# Patient Record
Sex: MALE | Race: WHITE | NOT HISPANIC OR LATINO | URBAN - METROPOLITAN AREA
[De-identification: names, ages, dates, MRNs, and addresses within clinical notes are randomized per-mention and may not be internally consistent; named-entity substitution may affect disease eponyms.]

---

## 2018-10-16 ENCOUNTER — INPATIENT (INPATIENT)
Facility: HOSPITAL | Age: 63
LOS: 0 days | Discharge: LEFT BEFORE DISCHARGE | End: 2018-10-17
Attending: INTERNAL MEDICINE | Admitting: INTERNAL MEDICINE

## 2018-10-16 VITALS
RESPIRATION RATE: 20 BRPM | OXYGEN SATURATION: 97 % | TEMPERATURE: 96 F | HEART RATE: 71 BPM | DIASTOLIC BLOOD PRESSURE: 105 MMHG | SYSTOLIC BLOOD PRESSURE: 195 MMHG

## 2018-10-16 DIAGNOSIS — Z98.890 OTHER SPECIFIED POSTPROCEDURAL STATES: Chronic | ICD-10-CM

## 2018-10-16 LAB
ALBUMIN SERPL ELPH-MCNC: 4.5 G/DL — SIGNIFICANT CHANGE UP (ref 3.5–5.2)
ALP SERPL-CCNC: 41 U/L — SIGNIFICANT CHANGE UP (ref 30–115)
ALT FLD-CCNC: 74 U/L — HIGH (ref 0–41)
ANION GAP SERPL CALC-SCNC: 17 MMOL/L — HIGH (ref 7–14)
APPEARANCE UR: CLEAR — SIGNIFICANT CHANGE UP
APTT BLD: 37 SEC — SIGNIFICANT CHANGE UP (ref 27–39.2)
AST SERPL-CCNC: 37 U/L — SIGNIFICANT CHANGE UP (ref 0–41)
BASOPHILS # BLD AUTO: 0.06 K/UL — SIGNIFICANT CHANGE UP (ref 0–0.2)
BASOPHILS NFR BLD AUTO: 0.6 % — SIGNIFICANT CHANGE UP (ref 0–1)
BILIRUB SERPL-MCNC: 1.6 MG/DL — HIGH (ref 0.2–1.2)
BILIRUB UR-MCNC: NEGATIVE — SIGNIFICANT CHANGE UP
BUN SERPL-MCNC: 16 MG/DL — SIGNIFICANT CHANGE UP (ref 10–20)
CALCIUM SERPL-MCNC: 9.1 MG/DL — SIGNIFICANT CHANGE UP (ref 8.5–10.1)
CHLORIDE SERPL-SCNC: 103 MMOL/L — SIGNIFICANT CHANGE UP (ref 98–110)
CK MB CFR SERPL CALC: 6.5 NG/ML — HIGH (ref 0.6–6.3)
CK SERPL-CCNC: 228 U/L — HIGH (ref 0–225)
CO2 SERPL-SCNC: 23 MMOL/L — SIGNIFICANT CHANGE UP (ref 17–32)
COLOR SPEC: YELLOW — SIGNIFICANT CHANGE UP
CREAT SERPL-MCNC: 1 MG/DL — SIGNIFICANT CHANGE UP (ref 0.7–1.5)
DIFF PNL FLD: NEGATIVE — SIGNIFICANT CHANGE UP
EOSINOPHIL # BLD AUTO: 0.22 K/UL — SIGNIFICANT CHANGE UP (ref 0–0.7)
EOSINOPHIL NFR BLD AUTO: 2.1 % — SIGNIFICANT CHANGE UP (ref 0–8)
GLUCOSE SERPL-MCNC: 102 MG/DL — HIGH (ref 70–99)
GLUCOSE UR QL: NEGATIVE MG/DL — SIGNIFICANT CHANGE UP
HCT VFR BLD CALC: 44.6 % — SIGNIFICANT CHANGE UP (ref 42–52)
HGB BLD-MCNC: 15.5 G/DL — SIGNIFICANT CHANGE UP (ref 14–18)
IMM GRANULOCYTES NFR BLD AUTO: 0.5 % — HIGH (ref 0.1–0.3)
INR BLD: 1.12 RATIO — SIGNIFICANT CHANGE UP (ref 0.65–1.3)
KETONES UR-MCNC: NEGATIVE — SIGNIFICANT CHANGE UP
LEUKOCYTE ESTERASE UR-ACNC: ABNORMAL
LYMPHOCYTES # BLD AUTO: 1.68 K/UL — SIGNIFICANT CHANGE UP (ref 1.2–3.4)
LYMPHOCYTES # BLD AUTO: 15.8 % — LOW (ref 20.5–51.1)
MAGNESIUM SERPL-MCNC: 2.1 MG/DL — SIGNIFICANT CHANGE UP (ref 1.8–2.4)
MCHC RBC-ENTMCNC: 31.6 PG — HIGH (ref 27–31)
MCHC RBC-ENTMCNC: 34.8 G/DL — SIGNIFICANT CHANGE UP (ref 32–37)
MCV RBC AUTO: 91 FL — SIGNIFICANT CHANGE UP (ref 80–94)
MONOCYTES # BLD AUTO: 0.97 K/UL — HIGH (ref 0.1–0.6)
MONOCYTES NFR BLD AUTO: 9.1 % — SIGNIFICANT CHANGE UP (ref 1.7–9.3)
NEUTROPHILS # BLD AUTO: 7.68 K/UL — HIGH (ref 1.4–6.5)
NEUTROPHILS NFR BLD AUTO: 71.9 % — SIGNIFICANT CHANGE UP (ref 42.2–75.2)
NITRITE UR-MCNC: NEGATIVE — SIGNIFICANT CHANGE UP
NT-PROBNP SERPL-SCNC: 1242 PG/ML — HIGH (ref 0–300)
PH UR: 6.5 — SIGNIFICANT CHANGE UP (ref 5–8)
PLATELET # BLD AUTO: 148 K/UL — SIGNIFICANT CHANGE UP (ref 130–400)
POTASSIUM SERPL-MCNC: 4.5 MMOL/L — SIGNIFICANT CHANGE UP (ref 3.5–5)
POTASSIUM SERPL-SCNC: 4.5 MMOL/L — SIGNIFICANT CHANGE UP (ref 3.5–5)
PROT SERPL-MCNC: 6.8 G/DL — SIGNIFICANT CHANGE UP (ref 6–8)
PROT UR-MCNC: NEGATIVE MG/DL — SIGNIFICANT CHANGE UP
PROTHROM AB SERPL-ACNC: 12.9 SEC — HIGH (ref 9.95–12.87)
RBC # BLD: 4.9 M/UL — SIGNIFICANT CHANGE UP (ref 4.7–6.1)
RBC # FLD: 12.9 % — SIGNIFICANT CHANGE UP (ref 11.5–14.5)
SODIUM SERPL-SCNC: 143 MMOL/L — SIGNIFICANT CHANGE UP (ref 135–146)
SP GR SPEC: 1.01 — SIGNIFICANT CHANGE UP (ref 1.01–1.03)
TROPONIN T SERPL-MCNC: <0.01 NG/ML — SIGNIFICANT CHANGE UP
TYPE + AB SCN PNL BLD: SIGNIFICANT CHANGE UP
UROBILINOGEN FLD QL: 0.2 MG/DL — SIGNIFICANT CHANGE UP (ref 0.2–0.2)
WBC # BLD: 10.66 K/UL — SIGNIFICANT CHANGE UP (ref 4.8–10.8)
WBC # FLD AUTO: 10.66 K/UL — SIGNIFICANT CHANGE UP (ref 4.8–10.8)

## 2018-10-16 RX ORDER — CLOPIDOGREL BISULFATE 75 MG/1
75 TABLET, FILM COATED ORAL DAILY
Qty: 0 | Refills: 0 | Status: DISCONTINUED | OUTPATIENT
Start: 2018-10-16 | End: 2018-10-17

## 2018-10-16 RX ORDER — METOPROLOL TARTRATE 50 MG
5 TABLET ORAL ONCE
Qty: 0 | Refills: 0 | Status: COMPLETED | OUTPATIENT
Start: 2018-10-16 | End: 2018-10-16

## 2018-10-16 RX ORDER — ASPIRIN/CALCIUM CARB/MAGNESIUM 324 MG
81 TABLET ORAL DAILY
Qty: 0 | Refills: 0 | Status: DISCONTINUED | OUTPATIENT
Start: 2018-10-16 | End: 2018-10-17

## 2018-10-16 RX ORDER — CLOPIDOGREL BISULFATE 75 MG/1
300 TABLET, FILM COATED ORAL ONCE
Qty: 0 | Refills: 0 | Status: COMPLETED | OUTPATIENT
Start: 2018-10-16 | End: 2018-10-16

## 2018-10-16 RX ORDER — ASPIRIN/CALCIUM CARB/MAGNESIUM 324 MG
325 TABLET ORAL ONCE
Qty: 0 | Refills: 0 | Status: COMPLETED | OUTPATIENT
Start: 2018-10-16 | End: 2018-10-16

## 2018-10-16 RX ORDER — SODIUM CHLORIDE 9 MG/ML
1000 INJECTION INTRAMUSCULAR; INTRAVENOUS; SUBCUTANEOUS ONCE
Qty: 0 | Refills: 0 | Status: COMPLETED | OUTPATIENT
Start: 2018-10-16 | End: 2018-10-16

## 2018-10-16 RX ADMIN — Medication 5 MILLIGRAM(S): at 16:12

## 2018-10-16 RX ADMIN — Medication 325 MILLIGRAM(S): at 22:01

## 2018-10-16 RX ADMIN — SODIUM CHLORIDE 1000 MILLILITER(S): 9 INJECTION INTRAMUSCULAR; INTRAVENOUS; SUBCUTANEOUS at 14:29

## 2018-10-16 RX ADMIN — CLOPIDOGREL BISULFATE 300 MILLIGRAM(S): 75 TABLET, FILM COATED ORAL at 22:02

## 2018-10-16 RX ADMIN — SODIUM CHLORIDE 1000 MILLILITER(S): 9 INJECTION INTRAMUSCULAR; INTRAVENOUS; SUBCUTANEOUS at 15:45

## 2018-10-16 NOTE — H&P ADULT - NSHPPHYSICALEXAM_GEN_ALL_CORE
PHYSICAL EXAM:  GENERAL: NAD, speaks in full sentences, no signs of respiratory distress  HEAD:  Atraumatic, Normocephalic  EYES: conjunctiva and sclera clear  NECK: Supple, No JVD  CHEST/LUNG: Clear to auscultation bilaterally; No wheeze; No crackles; No accessory muscles used  HEART: irregular; No murmurs;   ABDOMEN: Soft, Nontender, Nondistended; Bowel sounds present; No guarding  EXTREMITIES:  2+ Peripheral Pulses, No cyanosis or edema  PSYCH: AAOx3  NEUROLOGY: non-focal  SKIN: No rashes or lesions

## 2018-10-16 NOTE — H&P ADULT - HISTORY OF PRESENT ILLNESS
62yo Male with PMH below presented to the hospital for SOB with minimal exertion and chest tightness x 2-3 weeks. During episodes he has a chest pulling sensation in middle of chest that is worse with deep breathing. No back pain, no abdominal pain.  Patient had an echo outpt last week and was found to have atrial fibrillation (new) (stress echo was canceled - which was routine evaluation per pt), a valve abnorm, and thoracic aortic aneurysm of 5.5 cm (per pt); he is planned to have cardiac cath with Dr. Ryder this thursday for further evaluation. He spoke to his cards this AM - and advised to go to ED for evaluation concerned for aneurysm.    	Pt denies chest pain now, leg swelling, n/v, diaphoresis, or orthopnea. He is not sob now, but re-ocurrs with minimal exertion. Has been using his spiriva pump more this week with no resolution, has not used albuterol.    	Reports increased urination, not on diuretics, no dysuria. Deneis fever, cough, sick contacts. No recent travel. 62 yo Male with PMH below presented to the hospital for SOB with minimal exertion and chest tightness x 2-3 weeks. During episodes he has a chest pulling sensation in middle of chest that is worse with deep breathing. No back pain, no abdominal pain.  Patient had an echo outpt last week and was found to have atrial fibrillation (new) (stress echo was canceled - which was routine evaluation per pt), a valve abnorm, and thoracic aortic aneurysm of 5.5 cm (per pt); he is planned to have cardiac cath with Dr. Ryder this wednesday for further evaluation, and see EP Dr Townsend Thursday. He states that he was advised to go to ED prior to his cardiac cath.    	Pt denies chest pain now, leg swelling, n/v, diaphoresis, or orthopnea. He is not sob now, but re-ocurrs with minimal exertion. Has been using his spiriva pump more this week with no resolution, has not used albuterol.    	Reports increased urination, not on diuretics, no dysuria. Deneis fever, cough, sick contacts. No recent travel. 62 yo Male with PMH below presented to the hospital for SOB with minimal exertion and chest tightness x 2-3 weeks. During episodes he has a chest pulling sensation in middle of chest that is worse with deep breathing. No back pain, no abdominal pain. Patient states he saw Dr. Ryder and was scheduled to have an ECHO. Patient had an echo done and was found to have atrial fibrillation (new) (stress echo was canceled - which was routine evaluation per pt), a valve abnormal, and thoracic aortic aneurysm of 5.5 cm (per pt); he is planned to have cardiac cath with Dr. Ryder this wednesday for further evaluation, and see EP Dr Townsend Thursday. He states that he was advised to go to ED prior to his cardiac cath.    	Pt denies chest pain now, leg swelling, n/v, diaphoresis, or orthopnea. He is not sob now, but re-ocurrs with minimal exertion. Has been using his spiriva pump more this week with no resolution, has not used albuterol.    	Reports increased urination, not on diuretics, no dysuria. Deneis fever, cough, sick contacts. No recent travel. 64 yo Male with PMH as below presented to the hospital for SOB with minimal exertion and substernal chest tightness x 2-3 weeks. During episodes he has a chest pulling sensation in middle of chest that is worse with deep breathing. No back pain, no abdominal pain. Patient states he saw Dr. Ryder and was scheduled to have an ECHO. Patient had an echo done and was found to have atrial fibrillation (new) (stress echo was canceled - which was routine evaluation per pt), a valve abnormal, and thoracic aortic aneurysm of 5.5 cm (per pt). Patient states that he was told to stop taking aspirin and was put on xarelto and metoprolol. States that he was told that is he still continues to have symptoms to call cardiology. States that he called his cardiologist and was told he is planned to have cardiac cath with Dr. Ryder this wednesday/tomorrow for further evaluation, and see EP Dr Townsend Thursday. He states that he was advised to go to ED prior to his cardiac cath. Pt denies chest pain now, leg swelling, n/v, diaphoresis, or orthopnea. He is not sob now, but re-ocurrs with minimal exertion (ex when bending down).  Deneis fever, chills, nausea, vomitting cough, sick contacts, recent travel, diarrhea, dysuria. States he has chronic constipation.

## 2018-10-16 NOTE — ED ADULT NURSE REASSESSMENT NOTE - NS ED NURSE REASSESS COMMENT FT1
Pt assessed A&OX3, RN watching cardiac monitor noticed pt had run of v.tach , MD immediately notified , pt given 5 mg lopressor IV As per MD orders. As per MD to continue monitoring pt. pt's wife at bedside. will continue to monitor.

## 2018-10-16 NOTE — H&P ADULT - NSHPLABSRESULTS_GEN_ALL_CORE
Allergies    No Known Allergies    Intolerances        T(F): 98.7 (10-16-18 @ 17:00), Max: 98.7 (10-16-18 @ 17:00)  HR: 100 (10-16-18 @ 17:00) (71 - 113)  BP: 133/91 (10-16-18 @ 17:00) (133/91 - 195/105)  BP(mean): --  RR: 18 (10-16-18 @ 17:00) (18 - 20)  SpO2: 96% (10-16-18 @ 17:00) (96% - 97%)    10-16    143  |  103  |  16  ----------------------------<  102<H>  4.5   |  23  |  1.0    Ca    9.1      16 Oct 2018 13:49  Mg     2.1     10-16    TPro  6.8  /  Alb  4.5  /  TBili  1.6<H>  /  DBili  x   /  AST  37  /  ALT  74<H>  /  AlkPhos  41  10-16                            15.5   10.66 )-----------( 148      ( 16 Oct 2018 13:49 )             44.6                   Urinalysis Basic - ( 16 Oct 2018 13:41 )    Color: Yellow / Appearance: Clear / S.015 / pH: x  Gluc: x / Ketone: Negative  / Bili: Negative / Urobili: 0.2 mg/dL   Blood: x / Protein: Negative mg/dL / Nitrite: Negative   Leuk Esterase: Trace / RBC: 1-2 /HPF / WBC 1-2 /HPF   Sq Epi: x / Non Sq Epi: Occasional /HPF / Bacteria: x                  Urinalysis Basic - ( 16 Oct 2018 13:41 )    Color: Yellow / Appearance: Clear / S.015 / pH: x  Gluc: x / Ketone: Negative  / Bili: Negative / Urobili: 0.2 mg/dL   Blood: x / Protein: Negative mg/dL / Nitrite: Negative   Leuk Esterase: Trace / RBC: 1-2 /HPF / WBC 1-2 /HPF   Sq Epi: x / Non Sq Epi: Occasional /HPF / Bacteria: x        PT/INR - ( 16 Oct 2018 13:49 )   PT: 12.90 sec;   INR: 1.12 ratio         PTT - ( 16 Oct 2018 13:49 )  PTT:37.0 sec    Lactate Trend      CARDIAC MARKERS ( 16 Oct 2018 13:49 )  x     / <0.01 ng/mL / 228 U/L / x     / 6.5 ng/mL        < from: CT Chest No Cont (10.16.18 @ 16:57) >    1.  Ascending thoracic aorta measures 4.5 cm.  2.  Main pulmonary artery measures 4.6 cm, most consistent with pulmonary   arterial hypertension.    < end of copied text >

## 2018-10-16 NOTE — H&P ADULT - PSYCHIATRIC
Meets SIRS criteria with tachycardia, fevers. WBC Persistently elevated, unclear etiology. Knee and peritoneal fluid obtained and do not appear infected. Received vanc zosyn yesterday.   - Continue empiric antibiotics  - f/u knee imaging  - F/U cx  - f/u am CBC negative Meets SIRS criteria with tachycardia, fevers. WBC Persistently elevated, unclear etiology. Knee and peritoneal fluid obtained and do not appear infected. CXR with new infiltrate. Received vanc zosyn yesterday.   - Continue empiric antibiotics  - f/u knee imaging  - F/U cx  - f/u am CBC Diarrhea noted on follow up exam this AM; c.diff positive  - Vanco 125 PO q6

## 2018-10-16 NOTE — H&P ADULT - NSHPSOCIALHISTORY_GEN_ALL_CORE
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

## 2018-10-16 NOTE — ED PROVIDER NOTE - MEDICAL DECISION MAKING DETAILS
labs, ct reviewed. pt seen by cardiology. Pt voiced understanding and agrees with plan. Admitted to telemetry.

## 2018-10-16 NOTE — ED PROVIDER NOTE - PHYSICAL EXAMINATION
PHYSICAL EXAM:  GENERAL: anxious speaks in full sentences, no signs of respiratory distress, speaks in full sentences, wife at bedside  HEAD:  Atraumatic, Normocephalic  EYES: EOMI, conjunctiva and sclera clear  NECK: Supple, No JVD  CHEST/LUNG: Clear to auscultation bilaterally; No wheeze; No crackles; No accessory muscles used  HEART: irregulary irregular, pulses palpable symmetric and equal b/l radial  ABDOMEN: Soft, Nontender, Nondistended; Bowel sounds present; No guarding  EXTREMITIES:  no LE edema or tenderness, moves all ext  PSYCH: AAOx3  NEUROLOGY: non-focal  SKIN: No rashes or lesions

## 2018-10-16 NOTE — ED PROVIDER NOTE - CARE PLAN
Principal Discharge DX:	Shortness of breath  Secondary Diagnosis:	Afib  Secondary Diagnosis:	Thoracic aortic aneurysm without rupture

## 2018-10-16 NOTE — ED PROVIDER NOTE - PMH
Afib    CAD (coronary artery disease)  pci 2016  COPD (chronic obstructive pulmonary disease)    Depression    HTN (hypertension)    ETIENNE on CPAP    Thoracic aortic aneurysm without rupture

## 2018-10-16 NOTE — ED PROVIDER NOTE - ATTENDING CONTRIBUTION TO CARE
New afib last week. concern for expanding thoracic aneurysm with valve abnormality. Planned for cath tomorrow. presenting with sob at rest. On Xarelto. found to be in afib avr to 120. hd stable New afib last week. concern for expanding thoracic aneurysm with valve abnormality. Planned for cath tomorrow. presenting with sob at rest. On Xarelto. found to be in afib avr to 120. hd stable. Concern for symptomatic SVT vs APE vs ACS. no STEMI. Discussed with cardiology. Recommends non contrast ct to eval for TAA and admission for urgent catheterization. - labs including ce, ekg, trp, cxr, ct chest, rate control. admit.

## 2018-10-16 NOTE — ED PROVIDER NOTE - PROGRESS NOTE DETAILS
Check: CE, CMP, Mg, BNP. Place on tele monitor. CTA dissection study. CXR. Check: CE, CMP, Mg, BNP. Place on tele monitor. CTA dissection study. CXR. UA. Check: CE, CMP, Mg, BNP. Place on tele monitor. CTA dissection study. CXR. UA. Telemonitoring. I personally evaluated the patient. I reviewed the Resident's note (as assigned above), and agree with the findings and plan except as documented in my note.   64 y/o M PMHx of CAD s/p stent, recently diagnosed A-fib, on anticoagulation which was stopped yesterday;  presenting with SOB at rest, found to be in A-fib RVR to 120, HD stable. Pt was seen by cardiologist at bedside, pt had outpatient imaging demonstrating a TAA of approximately 5.5 on ultrasound. Physical Exam: AAOX3, NAD, NCAT, EOMI, PERRL, MMM, Neck Supple, Irregular tachycardic heartbeat, CTABL, ABD S/NT/ND +BS, No CVAT, EXT warm, well perfused, No Edema, Distal Pulses Intact, No Rash,  MAEx4, Sensation to soft touch grossly intact, normal strength/tone  Plan: Cardiology request non-contrast CT on eval, pt will receive cath tomorrow for eval of angina and will have aortal DERREK performed at that time, low suspicion for dissection,  will obtain labs, EKG, CT, rate control, and admit. 62 y/o M PMHx of CAD s/p stent, recently diagnosed A-fib, on anticoagulation which was stopped yesterday;  presenting with SOB at rest, found to be in A-fib RVR to 120, HD stable. Pt was seen by cardiologist at bedside, pt had outpatient imaging demonstrating a TAA of approximately 5.5 on ultrasound. Physical Exam: AAOX3, NAD, NCAT, EOMI, PERRL, MMM, Neck Supple, Irregular tachycardic heartbeat, CTABL, ABD S/NT/ND +BS, No CVAT, EXT warm, well perfused, No Edema, Distal Pulses Intact, No Rash,  MAEx4, Sensation to soft touch grossly intact, normal strength/tone  Plan: Cardiology request non-contrast CT on eval, pt will receive cath tomorrow for eval of angina and will have aortal DERREK performed at that time, low suspicion for dissection,  will obtain labs, EKG, CT, rate control, and admit. Pt rate controlled after lopressor 5 ml of IV, remain HD stable, CT demonstrates 4.5 TAA, admitted to Medicine for cath tomorrow.

## 2018-10-16 NOTE — CONSULT NOTE ADULT - ASSESSMENT
Thoracic aortic aneurysm  - 5.5 cm on outpatient echo  - non contrast chest CT for evaluation    Typical angina  - NPO for cath tomorrow  - c/w DAPT  - hold xarelto for cath     Paroxysmal Atrial fibrillation  - would rate control with cardizem gtt (BP can tolerate)  - on xarelto at home, on hold for cath Thoracic aortic aneurysm  - 5.5 cm on outpatient echo  - non contrast chest CT for evaluation    Typical angina  - NPO for cath tomorrow  - c/w DAPT  - hold xarelto for cath     Paroxysmal Atrial fibrillation  - would rate control with cardizem gtt (BP can tolerate)  - on xarelto at home, on hold for cath  - supposed to see dr black.

## 2018-10-16 NOTE — ED PROVIDER NOTE - NS ED ROS FT
REVIEW OF SYSTEMS:    CONSTITUTIONAL: No weakness or fevers  EYES/ENT: No visual changes  NECK: No pain or stiffness  RESPIRATORY: see hpi  CARDIOVASCULAR: see hpi  GASTROINTESTINAL: No abdominal pain. No nausea or vomiting; No diarrhea or constipation. No bloody or black colored stool  GENITOURINARY: increased urination  NEUROLOGICAL: No numbness or weakness  SKIN: No itching, rashes

## 2018-10-16 NOTE — H&P ADULT - ASSESSMENT
# angina  - admit to tele  - cardiac cath tomorrow  - npo after midnight  - c/w metoprolol 50mg q24h  - serial cardiac enzymes  - c/w DAPT      # BPH  -tamsulosin    #paroxysmal AFIB  p/w RVR  -metoprolol 25mg q24h # angina  - admit to tele  - cardiac cath tomorrow  - npo after midnight  - c/w metoprolol 50mg q24h  - serial cardiac enzymes  - cardiology on board- DAPT (patient laoded with plavix +aspirin)    # BPH  -tamsulosin    #paroxysmal AFIB    -patient was in RVR on admission, resolved now  -metoprolol 25mg q24h  -hold xarelto for cardiac cath (last dose taken 10/15)    #COPD- stable  -patient on spirva only at home    #anxiety  venlefaxine

## 2018-10-16 NOTE — H&P ADULT - FAMILY HISTORY
Father  Still living? Unknown  Family history of acute myocardial infarction, Age at diagnosis: Age Unknown     Mother  Still living? Unknown  Family history of acute myocardial infarction, Age at diagnosis: Age Unknown

## 2018-10-16 NOTE — ED ADULT NURSE NOTE - NSIMPLEMENTINTERV_GEN_ALL_ED
Implemented All Universal Safety Interventions:  [place] to call system. Call bell, personal items and telephone within reach. Instruct patient to call for assistance. Room bathroom lighting operational. Non-slip footwear when patient is off stretcher. Physically safe environment: no spills, clutter or unnecessary equipment. Stretcher in lowest position, wheels locked, appropriate side rails in place.

## 2018-10-16 NOTE — CONSULT NOTE ADULT - SUBJECTIVE AND OBJECTIVE BOX
Date of Admission:    CHIEF COMPLAINT:    HISTORY OF PRESENT ILLNESS: 63yMale with PMH below presented to the hospital for  SOB with minimal exertion and chest tightness. During episodes he has a chest pulling sensation in middle of chest that is worse with deep breathing. No back pain, no abdominal pain.    	Had echo outpt this week - found to have new AF (stress echo was canceled - which was routine evaluation per pt), a valve abnorm, and thoracic aortic aneurysm of 5.5 cm (per pt); he is planned to have cardiac cath with Dr. Ryder this thursday for further evaluation. He spoke to his cards this AM - and advised to go to ED for evaluation concerned for aneurysm.    	Pt denies chest pain now, leg swelling, n/v, diaphoresis, or orthopnea. He is not sob now, but re-ocurrs with minimal exertion. Has been using his spiriva pump more this week with no resolution, has not used albuterol.    	Reports increased urination, not on diuretics, no dysuria. Deneis fever, cough, sick contacts. No recent travel.  	      PAST MEDICAL & SURGICAL HISTORY:  Afib  Depression  Thoracic aortic aneurysm without rupture  COPD (chronic obstructive pulmonary disease)  ETIENNE on CPAP  HTN (hypertension)  CAD (coronary artery disease): pci 2016  H/O hernia repair  History of percutaneous coronary intervention: 2016    HEALTH ISSUES - PROBLEM Dx:        FAMILY HISTORY:    None [x ]  Mother:   Father:   Siblings:     SOCIAL HISTORY:    [ ] Non-smoker  [x ] Smoker: former... quit 2 years ago  [ ] Alcohol    Allergies    No Known Allergies    Intolerances    	    REVIEW OF SYSTEMS:  CONSTITUTIONAL: No fever, weight loss, or fatigue  CARDIOLOGY: see HPI  RESPIRATORY: See HPI  NEUROLOGICAL: NO weakness, no focal deficits to report.  ENDOCRINOLOGICAL: no recent change in diabetic medications.   GI: no BRBPR, no N,V,diarrhea.    PSYCHIATRY: normal mood and affect  HEENT: no nasal discharge, no ecchymosis  SKIN: no ecchymosis, no breakdown  MUSCULOSKELETAL: Full range of motion x4.      PHYSICAL EXAM:  T(C): 35.7 (10-16-18 @ 12:46), Max: 35.7 (10-16-18 @ 12:46)  HR: 71 (10-16-18 @ 12:46) (71 - 71)  BP: 195/105 (10-16-18 @ 12:46) (195/105 - 195/105)  RR: 20 (10-16-18 @ 12:46) (20 - 20)  SpO2: 97% (10-16-18 @ 12:46) (97% - 97%)  Wt(kg): --  I&O's Summary    Daily     Daily     General Appearance: Normal	  Cardiovascular: rapid and irregular S1 S2, No JVD, No murmurs, No edema  Respiratory: Lungs clear to auscultation	  Psychiatry: A & O x 3, Mood & affect appropriate  Gastrointestinal:  Soft, Non-tender  Skin: No rashes, No ecchymoses, No cyanosis	  Neurologic: Non-focal  Musculoskeletal/ extremities: Normal range of motion, No clubbing, cyanosis or edema  Vascular: Peripheral pulses palpable 2+ bilaterally    LABS:	 	                          15.5   10.66 )-----------( 148      ( 16 Oct 2018 13:49 )             44.6     10-16    143  |  103  |  16  ----------------------------<  102<H>  4.5   |  23  |  1.0    Ca    9.1      16 Oct 2018 13:49  Mg     2.1     10-16    TPro  6.8  /  Alb  4.5  /  TBili  1.6<H>  /  DBili  x   /  AST  37  /  ALT  74<H>  /  AlkPhos  41  10-16    CARDIAC MARKERS ( 16 Oct 2018 13:49 )  x     / <0.01 ng/mL / 228 U/L / x     / 6.5 ng/mL      PT/INR - ( 16 Oct 2018 13:49 )   PT: 12.90 sec;   INR: 1.12 ratio         PTT - ( 16 Oct 2018 13:49 )  PTT:37.0 sec    CARDIAC MARKERS:            TELEMETRY EVENTS: 	    ECG:  < from: 12 Lead ECG (10.16.18 @ 12:54) >  Diagnosis Line Atrial fibrillation with rapid ventricular response  Left axis deviation  Left anterior fascicular block  Cannot rule out Anterior infarct , age undetermined  Abnormal ECG    < end of copied text >  	  RADIOLOGY:  OTHER: 	    PREVIOUS DIAGNOSTIC TESTING:    [x ] Echocardiogram:  [ ]  Catheterization: PCI 2016  [ ] Stress Test:  	  	    Home Medications:    MEDICATIONS  (STANDING):    MEDICATIONS  (PRN): Date of Admission:    CHIEF COMPLAINT:    HISTORY OF PRESENT ILLNESS: 63yMale with PMH below presented to the hospital for  SOB with minimal exertion and chest tightness. During episodes he has a chest pulling sensation in middle of chest that is worse with deep breathing. No back pain, no abdominal pain.    	Had echo outpt this week - found to have new AF (stress echo was canceled - which was routine evaluation per pt), a valve abnorm, and thoracic aortic aneurysm of 5.5 cm (per pt); he is planned to have cardiac cath with Dr. Ryder this thursday for further evaluation. He spoke to his cards this AM - and advised to go to ED for evaluation concerned for aneurysm.    	Pt denies chest pain now, leg swelling, n/v, diaphoresis, or orthopnea. He is not sob now, but re-ocurrs with minimal exertion. Has been using his spiriva pump more this week with no resolution, has not used albuterol.    	Reports increased urination, not on diuretics, no dysuria. Deneis fever, cough, sick contacts. No recent travel.  	      PAST MEDICAL & SURGICAL HISTORY:  Afib  Depression  Thoracic aortic aneurysm without rupture  COPD (chronic obstructive pulmonary disease)  ETIENNE on CPAP  HTN (hypertension)  CAD (coronary artery disease): pci 2016  H/O hernia repair  History of percutaneous coronary intervention: 2016    HEALTH ISSUES - PROBLEM Dx:        FAMILY HISTORY:    None [x ]  Mother:   Father:   Siblings:     SOCIAL HISTORY:    [ ] Non-smoker  [x ] Smoker: former... quit 2 years ago  [ ] Alcohol    Allergies    No Known Allergies    Intolerances    	    REVIEW OF SYSTEMS:  CONSTITUTIONAL: No fever, weight loss, or fatigue  CARDIOLOGY: see HPI  RESPIRATORY: See HPI  NEUROLOGICAL: NO weakness, no focal deficits to report.  ENDOCRINOLOGICAL: no recent change in diabetic medications.   GI: no BRBPR, no N,V,diarrhea.    PSYCHIATRY: normal mood and affect  HEENT: no nasal discharge, no ecchymosis  SKIN: no ecchymosis, no breakdown  MUSCULOSKELETAL: Full range of motion x4.      PHYSICAL EXAM:  T(C): 35.7 (10-16-18 @ 12:46), Max: 35.7 (10-16-18 @ 12:46)  HR: 71 (10-16-18 @ 12:46) (71 - 71)  BP: 195/105 (10-16-18 @ 12:46) (195/105 - 195/105)  RR: 20 (10-16-18 @ 12:46) (20 - 20)  SpO2: 97% (10-16-18 @ 12:46) (97% - 97%)  Wt(kg): --  I&O's Summary    Daily     Daily     General Appearance: Normal	  Cardiovascular: rapid and irregular S1 S2, No JVD, No murmurs, No edema  Respiratory: Lungs clear to auscultation	  Psychiatry: A & O x 3, Mood & affect appropriate  Gastrointestinal:  Soft, Non-tender  Skin: No rashes, No ecchymoses, No cyanosis	  Neurologic: Non-focal  Musculoskeletal/ extremities: Normal range of motion, No clubbing, cyanosis or edema  Vascular: Peripheral pulses palpable 2+ bilaterally    LABS:	 	                          15.5   10.66 )-----------( 148      ( 16 Oct 2018 13:49 )             44.6     10-16    143  |  103  |  16  ----------------------------<  102<H>  4.5   |  23  |  1.0    Ca    9.1      16 Oct 2018 13:49  Mg     2.1     10-16    TPro  6.8  /  Alb  4.5  /  TBili  1.6<H>  /  DBili  x   /  AST  37  /  ALT  74<H>  /  AlkPhos  41  10-16    CARDIAC MARKERS ( 16 Oct 2018 13:49 )  x     / <0.01 ng/mL / 228 U/L / x     / 6.5 ng/mL      PT/INR - ( 16 Oct 2018 13:49 )   PT: 12.90 sec;   INR: 1.12 ratio         PTT - ( 16 Oct 2018 13:49 )  PTT:37.0 sec    CARDIAC MARKERS:    CT chest ascending aorta 4.5 cm.          TELEMETRY EVENTS: 	    ECG:  < from: 12 Lead ECG (10.16.18 @ 12:54) >  Diagnosis Line Atrial fibrillation with rapid ventricular response  Left axis deviation  Left anterior fascicular block  Cannot rule out Anterior infarct , age undetermined  Abnormal ECG    < end of copied text >  	  RADIOLOGY:  OTHER: 	    PREVIOUS DIAGNOSTIC TESTING:    [x ] Echocardiogram:  [ ]  Catheterization: PCI 2016  [ ] Stress Test:  	  	    Home Medications:    MEDICATIONS  (STANDING):    MEDICATIONS  (PRN):

## 2018-10-16 NOTE — ED PROVIDER NOTE - OBJECTIVE STATEMENT
63/M hx of CAD s/p PCI (2016, cards Dr. Orteag), New AFib (on xarelto - on hold last few days), thoracic aortic aneurysm (5.5 cm - pt states), valve abnorm, HTN. COPD, ETIENNE (uses cpap at night), presents to ED with SOB on minimal exertion and chest tightness x 2-3 days.    Had echo outpt this week - found to have new AF, valve abnorm, and thoracic aortic aneurysm of 5.5 cm (per pt); he is planned to have cardiac cath with Dr. Ortega this thrusday. He spoke to his cards this AM - and advised to go to ED, he as concerned about the aneurysm.    Pt deneis chest pain now, leg swelling, n/v, diaphoresis, or orthopnea. He is not sob now, but recurrs with minimal exertion.  Reports increased urination, not on diuretics, no dysuria.  Deneis fever, cough, sick contacts. 63/M hx of CAD s/p PCI (2016, cards Dr. Ortega), New AFib (on xarelto - on hold last few days), thoracic aortic aneurysm (5.5 cm - pt states), x-smoker (15 PY, quit 2 yrs ago), valve abnorm, HTN. COPD, ETIENNE (uses cpap at night), presents to ED with SOB on minimal exertion and chest tightness x 2-3 days.    Had echo outpt this week - found to have new AF, valve abnorm, and thoracic aortic aneurysm of 5.5 cm (per pt); he is planned to have cardiac cath with Dr. Ortega this thrusday. He spoke to his cards this AM - and advised to go to ED, he as concerned about the aneurysm.    Pt deneis chest pain now, leg swelling, n/v, diaphoresis, or orthopnea. He is not sob now, but recurrs with minimal exertion. Has been using his spiriva pump more this week with no resolution.    Reports increased urination, not on diuretics, no dysuria.  Deneis fever, cough, sick contacts.    No prior EKG or echo. Hx from pt and wife. 63/M hx of CAD s/p PCI (2016, cards Dr. Ortega), New AFib (on xarelto - on hold last few days), thoracic aortic aneurysm (5.5 cm - pt states), x-smoker (15 PY, quit 2 yrs ago), valve abnorm, HTN. COPD, ETIENNE (uses cpap at night), presents to ED with SOB on minimal exertion and chest tightness x 2-3 days. During episodes he has a chest pulling sensation in middle of chest that is worse with deep breathing. No back pain, no abdominal pain.    Had echo outpt this week - found to have new AF (stress echo was canceled - which was routine evaluation per pt), a valve abnorm, and thoracic aortic aneurysm of 5.5 cm (per pt); he is planned to have cardiac cath with Dr. Ortega this thrusday for further evaluation. He spoke to his cards this AM - and advised to go to ED for evaluation.    Pt deneis chest pain now, leg swelling, n/v, diaphoresis, or orthopnea. He is not sob now, but reocurrs with minimal exertion. Has been using his spiriva pump more this week with no resolution, has not used albuterol.    Reports increased urination, not on diuretics, no dysuria. Deneis fever, cough, sick contacts. No recent travel.   No prior EKG or echo. Hx from pt and wife. 63/M hx of CAD s/p PCI (2016, cards Dr. Ortega), New AFib (on xarelto - on hold last few days), thoracic aortic aneurysm (5.5 cm - pt states), x-smoker (15 PY, quit 2 yrs ago), valve abnorm, HTN. COPD, ETIENNE (uses cpap at night), presents to ED with SOB on minimal exertion and chest tightness x 2-3 days. During episodes he has a chest pulling sensation in middle of chest that is worse with deep breathing. No back pain, no abdominal pain.    Had echo outpt this week - found to have new AF (stress echo was canceled - which was routine evaluation per pt), a valve abnorm, and thoracic aortic aneurysm of 5.5 cm (per pt); he is planned to have cardiac cath with Dr. Ortega this thrusday for further evaluation. He spoke to his cards this AM - and advised to go to ED for evaluation concerned for aneurysm.    Pt deneis chest pain now, leg swelling, n/v, diaphoresis, or orthopnea. He is not sob now, but reocurrs with minimal exertion. Has been using his spiriva pump more this week with no resolution, has not used albuterol.    Reports increased urination, not on diuretics, no dysuria. Deneis fever, cough, sick contacts. No recent travel.   No prior EKG or echo. Hx from pt and wife. 63/M hx of CAD s/p PCI (2016, cards Dr. Ortega), New AFib (on xarelto - last took was 10/15), thoracic aortic aneurysm (5.5 cm - pt states), x-smoker (15 PY, quit 2 yrs ago), valve abnorm, HTN. COPD, ETIENNE (uses cpap at night), presents to ED with SOB on minimal exertion and chest tightness x 2-3 days. During episodes he has a chest pulling sensation in middle of chest that is worse with deep breathing. No back pain, no abdominal pain.    Had echo outpt this week - found to have new AF (stress echo was canceled - which was routine evaluation per pt), a valve abnorm, and thoracic aortic aneurysm of 5.5 cm (per pt); he is planned to have cardiac cath with Dr. Ortega this thrusday for further evaluation. He spoke to his cards this AM - and advised to go to ED for evaluation concerned for aneurysm.    Pt deneis chest pain now, leg swelling, n/v, diaphoresis, or orthopnea. He is not sob now, but reocurrs with minimal exertion. Has been using his spiriva pump more this week with no resolution, has not used albuterol.    Reports increased urination, not on diuretics, no dysuria. Deneis fever, cough, sick contacts. No recent travel.   No prior EKG or echo. Hx from pt and wife. 63/M hx of CAD s/p PCI (2016, cards Dr. Ortega), New AFib (on xarelto - last took was 10/15), thoracic aortic aneurysm (5.5 cm - pt states), x-smoker (15 PY, quit 2 yrs ago), valve abnormality, HTN. COPD, ETIENNE (uses cpap at night), presents to ED with SOB on minimal exertion and chest tightness x 2-3 days. During episodes he has a chest pulling sensation in middle of chest that is worse with deep breathing. No back pain, no abdominal pain.    Had echo outpt this week - found to have new AF (stress echo was canceled - which was routine evaluation per pt), a valve abnorm, and thoracic aortic aneurysm of 5.5 cm (per pt); he is planned to have cardiac cath with Dr. Ortega this thrusday for further evaluation. He spoke to his cards this AM - and advised to go to ED for evaluation concerned for aneurysm.    Pt deneis chest pain now, leg swelling, n/v, diaphoresis, or orthopnea. He is not sob now, but reocurrs with minimal exertion. Has been using his spiriva pump more this week with no resolution, has not used albuterol.    Reports increased urination, not on diuretics, no dysuria. Deneis fever, cough, sick contacts. No recent travel.   No prior EKG or echo. Hx from pt and wife.

## 2018-10-16 NOTE — ED ADULT NURSE REASSESSMENT NOTE - NS ED NURSE REASSESS COMMENT FT1
pt assessed A&Ox3, pt on cardiac monitor pt bp 150/123, MD made aware, Md to order medication for treatment. Safety and comfort measures maintained. Pt wife at bedside.  Will continue to monitor.

## 2018-10-16 NOTE — ED ADULT TRIAGE NOTE - CHIEF COMPLAINT QUOTE
SOB and tightness of chest for a couple of days now, pt states he was suppose to have a cath tomorrow

## 2018-10-17 VITALS
TEMPERATURE: 96 F | RESPIRATION RATE: 18 BRPM | DIASTOLIC BLOOD PRESSURE: 60 MMHG | HEART RATE: 69 BPM | SYSTOLIC BLOOD PRESSURE: 114 MMHG

## 2018-10-17 LAB
ANION GAP SERPL CALC-SCNC: 14 MMOL/L — SIGNIFICANT CHANGE UP (ref 7–14)
APTT BLD: 32.5 SEC — SIGNIFICANT CHANGE UP (ref 27–39.2)
BASOPHILS # BLD AUTO: 0.05 K/UL — SIGNIFICANT CHANGE UP (ref 0–0.2)
BASOPHILS NFR BLD AUTO: 0.8 % — SIGNIFICANT CHANGE UP (ref 0–1)
BUN SERPL-MCNC: 13 MG/DL — SIGNIFICANT CHANGE UP (ref 10–20)
CALCIUM SERPL-MCNC: 8.6 MG/DL — SIGNIFICANT CHANGE UP (ref 8.5–10.1)
CHLORIDE SERPL-SCNC: 103 MMOL/L — SIGNIFICANT CHANGE UP (ref 98–110)
CHOLEST SERPL-MCNC: 101 MG/DL — SIGNIFICANT CHANGE UP (ref 100–200)
CK MB CFR SERPL CALC: 6.1 NG/ML — SIGNIFICANT CHANGE UP (ref 0.6–6.3)
CK SERPL-CCNC: 184 U/L — SIGNIFICANT CHANGE UP (ref 0–225)
CO2 SERPL-SCNC: 24 MMOL/L — SIGNIFICANT CHANGE UP (ref 17–32)
CREAT SERPL-MCNC: 0.9 MG/DL — SIGNIFICANT CHANGE UP (ref 0.7–1.5)
EOSINOPHIL # BLD AUTO: 0.27 K/UL — SIGNIFICANT CHANGE UP (ref 0–0.7)
EOSINOPHIL NFR BLD AUTO: 4.4 % — SIGNIFICANT CHANGE UP (ref 0–8)
GLUCOSE SERPL-MCNC: 97 MG/DL — SIGNIFICANT CHANGE UP (ref 70–99)
HCT VFR BLD CALC: 43.7 % — SIGNIFICANT CHANGE UP (ref 42–52)
HDLC SERPL-MCNC: 47 MG/DL — SIGNIFICANT CHANGE UP
HGB BLD-MCNC: 15.2 G/DL — SIGNIFICANT CHANGE UP (ref 14–18)
IMM GRANULOCYTES NFR BLD AUTO: 0.2 % — SIGNIFICANT CHANGE UP (ref 0.1–0.3)
INR BLD: 1.15 RATIO — SIGNIFICANT CHANGE UP (ref 0.65–1.3)
LIPID PNL WITH DIRECT LDL SERPL: 51 MG/DL — SIGNIFICANT CHANGE UP (ref 4–129)
LYMPHOCYTES # BLD AUTO: 0.96 K/UL — LOW (ref 1.2–3.4)
LYMPHOCYTES # BLD AUTO: 15.7 % — LOW (ref 20.5–51.1)
MCHC RBC-ENTMCNC: 31.3 PG — HIGH (ref 27–31)
MCHC RBC-ENTMCNC: 34.8 G/DL — SIGNIFICANT CHANGE UP (ref 32–37)
MCV RBC AUTO: 90.1 FL — SIGNIFICANT CHANGE UP (ref 80–94)
MONOCYTES # BLD AUTO: 0.8 K/UL — HIGH (ref 0.1–0.6)
MONOCYTES NFR BLD AUTO: 13.1 % — HIGH (ref 1.7–9.3)
NEUTROPHILS # BLD AUTO: 4.01 K/UL — SIGNIFICANT CHANGE UP (ref 1.4–6.5)
NEUTROPHILS NFR BLD AUTO: 65.8 % — SIGNIFICANT CHANGE UP (ref 42.2–75.2)
PLATELET # BLD AUTO: 151 K/UL — SIGNIFICANT CHANGE UP (ref 130–400)
POTASSIUM SERPL-MCNC: 4 MMOL/L — SIGNIFICANT CHANGE UP (ref 3.5–5)
POTASSIUM SERPL-SCNC: 4 MMOL/L — SIGNIFICANT CHANGE UP (ref 3.5–5)
PROTHROM AB SERPL-ACNC: 13.2 SEC — HIGH (ref 9.95–12.87)
RBC # BLD: 4.85 M/UL — SIGNIFICANT CHANGE UP (ref 4.7–6.1)
RBC # FLD: 12.6 % — SIGNIFICANT CHANGE UP (ref 11.5–14.5)
SODIUM SERPL-SCNC: 141 MMOL/L — SIGNIFICANT CHANGE UP (ref 135–146)
TOTAL CHOLESTEROL/HDL RATIO MEASUREMENT: 2.1 RATIO — LOW (ref 4–5.5)
TRIGL SERPL-MCNC: 73 MG/DL — SIGNIFICANT CHANGE UP (ref 10–149)
TROPONIN T SERPL-MCNC: <0.01 NG/ML — SIGNIFICANT CHANGE UP
TSH SERPL-MCNC: 1.99 UIU/ML — SIGNIFICANT CHANGE UP (ref 0.27–4.2)
WBC # BLD: 6.1 K/UL — SIGNIFICANT CHANGE UP (ref 4.8–10.8)
WBC # FLD AUTO: 6.1 K/UL — SIGNIFICANT CHANGE UP (ref 4.8–10.8)

## 2018-10-17 RX ORDER — TAMSULOSIN HYDROCHLORIDE 0.4 MG/1
0.4 CAPSULE ORAL AT BEDTIME
Qty: 0 | Refills: 0 | Status: DISCONTINUED | OUTPATIENT
Start: 2018-10-17 | End: 2018-10-17

## 2018-10-17 RX ORDER — ATORVASTATIN CALCIUM 80 MG/1
80 TABLET, FILM COATED ORAL AT BEDTIME
Qty: 0 | Refills: 0 | Status: DISCONTINUED | OUTPATIENT
Start: 2018-10-17 | End: 2018-10-17

## 2018-10-17 RX ORDER — CHLORHEXIDINE GLUCONATE 213 G/1000ML
1 SOLUTION TOPICAL
Qty: 0 | Refills: 0 | Status: DISCONTINUED | OUTPATIENT
Start: 2018-10-17 | End: 2018-10-17

## 2018-10-17 RX ORDER — RIVAROXABAN 15 MG-20MG
20 KIT ORAL EVERY 24 HOURS
Qty: 0 | Refills: 0 | Status: DISCONTINUED | OUTPATIENT
Start: 2018-10-18 | End: 2018-10-17

## 2018-10-17 RX ORDER — SODIUM CHLORIDE 9 MG/ML
450 INJECTION INTRAMUSCULAR; INTRAVENOUS; SUBCUTANEOUS
Qty: 0 | Refills: 0 | Status: DISCONTINUED | OUTPATIENT
Start: 2018-10-17 | End: 2018-10-17

## 2018-10-17 RX ORDER — METOPROLOL TARTRATE 50 MG
50 TABLET ORAL THREE TIMES A DAY
Qty: 0 | Refills: 0 | Status: DISCONTINUED | OUTPATIENT
Start: 2018-10-17 | End: 2018-10-17

## 2018-10-17 RX ORDER — SODIUM CHLORIDE 9 MG/ML
1000 INJECTION INTRAMUSCULAR; INTRAVENOUS; SUBCUTANEOUS
Qty: 0 | Refills: 0 | Status: DISCONTINUED | OUTPATIENT
Start: 2018-10-17 | End: 2018-10-17

## 2018-10-17 RX ORDER — INFLUENZA VIRUS VACCINE 15; 15; 15; 15 UG/.5ML; UG/.5ML; UG/.5ML; UG/.5ML
0.5 SUSPENSION INTRAMUSCULAR ONCE
Qty: 0 | Refills: 0 | Status: DISCONTINUED | OUTPATIENT
Start: 2018-10-17 | End: 2018-10-17

## 2018-10-17 RX ORDER — LOSARTAN POTASSIUM 100 MG/1
50 TABLET, FILM COATED ORAL DAILY
Qty: 0 | Refills: 0 | Status: DISCONTINUED | OUTPATIENT
Start: 2018-10-17 | End: 2018-10-17

## 2018-10-17 RX ORDER — TIOTROPIUM BROMIDE 18 UG/1
1 CAPSULE ORAL; RESPIRATORY (INHALATION) DAILY
Qty: 0 | Refills: 0 | Status: DISCONTINUED | OUTPATIENT
Start: 2018-10-17 | End: 2018-10-17

## 2018-10-17 RX ORDER — VENLAFAXINE HCL 75 MG
37.5 CAPSULE, EXT RELEASE 24 HR ORAL DAILY
Qty: 0 | Refills: 0 | Status: DISCONTINUED | OUTPATIENT
Start: 2018-10-17 | End: 2018-10-17

## 2018-10-17 RX ORDER — METOPROLOL TARTRATE 50 MG
25 TABLET ORAL DAILY
Qty: 0 | Refills: 0 | Status: DISCONTINUED | OUTPATIENT
Start: 2018-10-17 | End: 2018-10-17

## 2018-10-17 RX ORDER — NITROGLYCERIN 6.5 MG
0.4 CAPSULE, EXTENDED RELEASE ORAL
Qty: 0 | Refills: 0 | Status: DISCONTINUED | OUTPATIENT
Start: 2018-10-17 | End: 2018-10-17

## 2018-10-17 RX ADMIN — Medication 37.5 MILLIGRAM(S): at 11:13

## 2018-10-17 RX ADMIN — CHLORHEXIDINE GLUCONATE 1 APPLICATION(S): 213 SOLUTION TOPICAL at 06:16

## 2018-10-17 RX ADMIN — Medication 25 MILLIGRAM(S): at 06:14

## 2018-10-17 RX ADMIN — Medication 81 MILLIGRAM(S): at 11:13

## 2018-10-17 RX ADMIN — LOSARTAN POTASSIUM 50 MILLIGRAM(S): 100 TABLET, FILM COATED ORAL at 06:14

## 2018-10-17 RX ADMIN — TIOTROPIUM BROMIDE 1 CAPSULE(S): 18 CAPSULE ORAL; RESPIRATORY (INHALATION) at 08:28

## 2018-10-17 RX ADMIN — CLOPIDOGREL BISULFATE 75 MILLIGRAM(S): 75 TABLET, FILM COATED ORAL at 11:13

## 2018-10-17 NOTE — PROGRESS NOTE ADULT - ASSESSMENT
# angina  - cardiac cath today - can resume diet after returning from cath lab  - c/w metoprolol 50mg q24h  -     # BPH  -tamsulosin    #paroxysmal AFIB    -patient was in RVR on admission, resolved now, HR has been WNL's on floor  -metoprolol 25mg q24h  -can resume xarelto tonight    #COPD- stable  -patient on spirva only at home    #anxiety  venlefaxine # angina  - trops negative  - cardiac cath today - can resume diet after returning from cath lab  - c/w metoprolol, statin, aspirin     # BPH  -tamsulosin    #paroxysmal AFIB    -patient was in RVR on admission, resolved now, HR has been WNL's on floor  -metoprolol 25mg q24h  -can resume xarelto tonight    #COPD- stable  -patient on spirva only at home    #anxiety  venlefaxine

## 2018-10-17 NOTE — PROGRESS NOTE ADULT - SUBJECTIVE AND OBJECTIVE BOX
PREOPERATIVE DAY OF PROCEDURE EVALUATION:  I have personally seen and examined the patient.  I agree with the history and physical which I have reviewed and noted any changes below.  (Signed electronically by __________)  10-17-18 @ 14:30

## 2018-10-17 NOTE — CHART NOTE - NSCHARTNOTEFT_GEN_A_CORE
Patient wanted to go home after cardiac cath was done. Spoke to Cardiac fellow about the discharge.  wanted to monitor the patient overnight for atrial fibrillation. Patient was not willing to stay overnight. Explained the risks of leaving the hospital without heart rate control. PAtient is aware of the risks. Explained about the risks to the wife as well. Patient decided to leave against medical advice.

## 2018-10-17 NOTE — PROGRESS NOTE ADULT - SUBJECTIVE AND OBJECTIVE BOX
POST OPERATIVE PROCEDURAL DOCUMENTATION  PRE-OP DIAGNOSIS: CAD    POST-OP DIAGNOSIS: non-obstructive CAD    PROCEDURE:   LEFT HEART CATHERIZATION    Physician: Britni STERLING  Assistant: Richie STERLING    ANESTHESIA TYPE:  [ x] Sedation  [x ] Local/Regional  [  ]General Anesthesia    ESTIMATED BLOOD LOSS:     <10 ml     CONDITION  [x ] Good  [   ] Fair  [   ] Serious  [   ] Critical      SPECIMENS REMOVED (IF APPLICABLE):  none      IMPLANTS (IF APPLICABLE): none      FINDINGS:    LEFT HEART CATHERIZATION                                    LVEF%: 55%  LVEDP: WNL    Left main: WNL    LAD: patent prior proximal stent                           Left Circumflex:  mild disease      Right Cornary Artery: mild disease      PERCUTANEOUS CORONARY INTERVENTIONS: none      COMPLICATIONS: none      POST-OP DIAGNOSIS    [ ] Normal Coronary Arteries  [ ] Luminal Irregularities  [x ] Non-obstructive CAD        PLAN OF CARE      [ ] D/C Home today   [ ]  D/C in AM  [x ] Return to In-patient bed  [ ] Admit for observation  [ ] Return for staged procedure:  [ ] CT Surgery consult called  [ ]  Continue DAPT, B-blocker & Statin therapy  [x ]  Medical Therapy  [x ] Aggressive risk factor modification. The patient should follow a low fat and low calorie diet.    Optimization of rate control and resume xarelto. POST OPERATIVE PROCEDURAL DOCUMENTATION  PRE-OP DIAGNOSIS: CAD    POST-OP DIAGNOSIS: non-obstructive CAD    PROCEDURE:   LEFT HEART CATHERIZATION    Physician: Britni STERLING  Assistant: Richie STERLING    ANESTHESIA TYPE:  [ x] Sedation  [x ] Local/Regional  [  ]General Anesthesia    ESTIMATED BLOOD LOSS:     <10 ml     CONDITION  [x ] Good  [   ] Fair  [   ] Serious  [   ] Critical      SPECIMENS REMOVED (IF APPLICABLE):  none      IMPLANTS (IF APPLICABLE): none      FINDINGS:    LEFT HEART CATHERIZATION                                    LVEF%: 55%  LVEDP: WNL    Left main: WNL    LAD: patent prior proximal stent                           Left Circumflex:  mild disease      Right Cornary Artery: mild disease      PERCUTANEOUS CORONARY INTERVENTIONS: none      COMPLICATIONS: none      POST-OP DIAGNOSIS    [ ] Normal Coronary Arteries  [ ] Luminal Irregularities  [x ] Non-obstructive CAD        PLAN OF CARE      [ ] D/C Home today   [ ]  D/C in AM  [x ] Return to In-patient bed  [ ] Admit for observation  [ ] Return for staged procedure:  [ ] CT Surgery consult called  [ ]  Continue DAPT, B-blocker & Statin therapy  [x ]  Medical Therapy  [x ] Aggressive risk factor modification. The patient should follow a low fat and low calorie diet.    Optimization of rate control and resume xarelto.      agree

## 2018-10-17 NOTE — PROGRESS NOTE ADULT - SUBJECTIVE AND OBJECTIVE BOX
SUBJECTIVE:    Patient is a 63y old Male who presents with a chief complaint of Post Cath (17 Oct 2018 16:08)    Stable, no acute events overnight. This morning he does not have any complaints this morning.     PAST MEDICAL & SURGICAL HISTORY  BPH (benign prostatic hyperplasia)  Afib  Depression  Thoracic aortic aneurysm without rupture  COPD (chronic obstructive pulmonary disease)  ETIENNE on CPAP  HTN (hypertension)  CAD (coronary artery disease): pci   H/O hernia repair  History of percutaneous coronary intervention: 2016    SOCIAL HISTORY:  Negative for smoking/alcohol/drug use.     ALLERGIES:  No Known Allergies    MEDICATIONS:  STANDING MEDICATIONS  aspirin  chewable 81 milliGRAM(s) Oral daily  atorvastatin 80 milliGRAM(s) Oral at bedtime  chlorhexidine 4% Liquid 1 Application(s) Topical <User Schedule>  influenza   Vaccine 0.5 milliLiter(s) IntraMuscular once  losartan 50 milliGRAM(s) Oral daily  metoprolol tartrate 50 milliGRAM(s) Oral three times a day  sodium chloride 0.9%. 1000 milliLiter(s) IV Continuous <Continuous>  sodium chloride 0.9%. 450 milliLiter(s) IV Continuous <Continuous>  tamsulosin 0.4 milliGRAM(s) Oral at bedtime  tiotropium 18 MICROgram(s) Capsule 1 Capsule(s) Inhalation daily  venlafaxine XR. 37.5 milliGRAM(s) Oral daily    PRN MEDICATIONS  nitroglycerin     SubLingual 0.4 milliGRAM(s) SubLingual every 5 minutes PRN    VITALS:   T(F): 96.2  HR: 69  BP: 144/85  RR: 18  SpO2: --    LABS:                        15.2   6.10  )-----------( 151      ( 17 Oct 2018 08:24 )             43.7     10-17    141  |  103  |  13  ----------------------------<  97  4.0   |  24  |  0.9    Ca    8.6      17 Oct 2018 08:24  Mg     2.1     10-16    TPro  6.8  /  Alb  4.5  /  TBili  1.6<H>  /  DBili  x   /  AST  37  /  ALT  74<H>  /  AlkPhos  41  10-16    PT/INR - ( 17 Oct 2018 08:24 )   PT: 13.20 sec;   INR: 1.15 ratio         PTT - ( 17 Oct 2018 08:24 )  PTT:32.5 sec  Urinalysis Basic - ( 16 Oct 2018 13:41 )    Color: Yellow / Appearance: Clear / S.015 / pH: x  Gluc: x / Ketone: Negative  / Bili: Negative / Urobili: 0.2 mg/dL   Blood: x / Protein: Negative mg/dL / Nitrite: Negative   Leuk Esterase: Trace / RBC: 1-2 /HPF / WBC 1-2 /HPF   Sq Epi: x / Non Sq Epi: Occasional /HPF / Bacteria: x        Creatine Kinase, Serum: 184 U/L (10-17-18 @ 08:24)  Troponin T, Serum: <0.01 ng/mL (10-17-18 @ 08:24)      CARDIAC MARKERS ( 17 Oct 2018 08:24 )  x     / <0.01 ng/mL / 184 U/L / x     / 6.1 ng/mL  CARDIAC MARKERS ( 16 Oct 2018 13:49 )  x     / <0.01 ng/mL / 228 U/L / x     / 6.5 ng/mL          10-17-18 @ 07:01  -  10-17-18 @ 18:15  --------------------------------------------------------  IN: 525 mL / OUT: 200 mL / NET: 325 mL          PHYSICAL EXAM:  GEN: NAD  LUNGS: CTAB  HEART: RRR  ABD: soft, +BS  EXT: no edema  NEURO: AAOx3

## 2018-10-24 DIAGNOSIS — F41.9 ANXIETY DISORDER, UNSPECIFIED: ICD-10-CM

## 2018-10-24 DIAGNOSIS — I10 ESSENTIAL (PRIMARY) HYPERTENSION: ICD-10-CM

## 2018-10-24 DIAGNOSIS — I71.2 THORACIC AORTIC ANEURYSM, WITHOUT RUPTURE: ICD-10-CM

## 2018-10-24 DIAGNOSIS — R00.0 TACHYCARDIA, UNSPECIFIED: ICD-10-CM

## 2018-10-24 DIAGNOSIS — R06.02 SHORTNESS OF BREATH: ICD-10-CM

## 2018-10-24 DIAGNOSIS — Z87.891 PERSONAL HISTORY OF NICOTINE DEPENDENCE: ICD-10-CM

## 2018-10-24 DIAGNOSIS — J44.9 CHRONIC OBSTRUCTIVE PULMONARY DISEASE, UNSPECIFIED: ICD-10-CM

## 2018-10-24 DIAGNOSIS — K59.09 OTHER CONSTIPATION: ICD-10-CM

## 2018-10-24 DIAGNOSIS — G47.33 OBSTRUCTIVE SLEEP APNEA (ADULT) (PEDIATRIC): ICD-10-CM

## 2018-10-24 DIAGNOSIS — I25.119 ATHEROSCLEROTIC HEART DISEASE OF NATIVE CORONARY ARTERY WITH UNSPECIFIED ANGINA PECTORIS: ICD-10-CM

## 2018-10-24 DIAGNOSIS — I48.0 PAROXYSMAL ATRIAL FIBRILLATION: ICD-10-CM

## 2018-10-24 DIAGNOSIS — N40.0 BENIGN PROSTATIC HYPERPLASIA WITHOUT LOWER URINARY TRACT SYMPTOMS: ICD-10-CM

## 2018-10-24 DIAGNOSIS — Z98.61 CORONARY ANGIOPLASTY STATUS: ICD-10-CM

## 2018-10-24 DIAGNOSIS — Z99.89 DEPENDENCE ON OTHER ENABLING MACHINES AND DEVICES: ICD-10-CM

## 2018-10-24 DIAGNOSIS — Z79.01 LONG TERM (CURRENT) USE OF ANTICOAGULANTS: ICD-10-CM

## 2018-10-24 PROBLEM — I25.10 ATHEROSCLEROTIC HEART DISEASE OF NATIVE CORONARY ARTERY WITHOUT ANGINA PECTORIS: Chronic | Status: ACTIVE | Noted: 2018-10-16

## 2018-10-24 PROBLEM — I48.91 UNSPECIFIED ATRIAL FIBRILLATION: Chronic | Status: ACTIVE | Noted: 2018-10-16

## 2018-10-30 ENCOUNTER — OUTPATIENT (OUTPATIENT)
Dept: OUTPATIENT SERVICES | Facility: HOSPITAL | Age: 63
LOS: 1 days | Discharge: HOME | End: 2018-10-30

## 2018-10-30 VITALS
OXYGEN SATURATION: 98 % | WEIGHT: 235.89 LBS | TEMPERATURE: 98 F | DIASTOLIC BLOOD PRESSURE: 90 MMHG | HEIGHT: 68 IN | RESPIRATION RATE: 18 BRPM | HEART RATE: 76 BPM | SYSTOLIC BLOOD PRESSURE: 136 MMHG

## 2018-10-30 DIAGNOSIS — I48.0 PAROXYSMAL ATRIAL FIBRILLATION: ICD-10-CM

## 2018-10-30 DIAGNOSIS — Z01.818 ENCOUNTER FOR OTHER PREPROCEDURAL EXAMINATION: ICD-10-CM

## 2018-10-30 DIAGNOSIS — Z98.890 OTHER SPECIFIED POSTPROCEDURAL STATES: Chronic | ICD-10-CM

## 2018-10-30 RX ORDER — TIOTROPIUM BROMIDE 18 UG/1
1 CAPSULE ORAL; RESPIRATORY (INHALATION)
Qty: 0 | Refills: 0 | COMMUNITY

## 2018-10-30 NOTE — H&P PST ADULT - PMH
Afib    BPH (benign prostatic hyperplasia)    CAD (coronary artery disease)  pci 2016  COPD (chronic obstructive pulmonary disease)    Depression    HTN (hypertension)    ETIENNE on CPAP    Thoracic aortic aneurysm without rupture Afib    BPH (benign prostatic hyperplasia)    CAD (coronary artery disease)  pci 2016  COPD (chronic obstructive pulmonary disease)    Depression    GERD without esophagitis    HTN (hypertension)    Myocardial infarction involving other coronary artery    ETIENNE on CPAP    ETIENNE on CPAP    SOB (shortness of breath) on exertion    Thoracic aortic aneurysm without rupture Afib    BPH (benign prostatic hyperplasia)    CAD (coronary artery disease)  pci 2016  COPD (chronic obstructive pulmonary disease)    Depression    GERD without esophagitis    HTN (hypertension)    Myocardial infarction involving other coronary artery  Proximal LAD 90%  ETIENNE on CPAP    ETIENNE on CPAP    SOB (shortness of breath) on exertion    Thoracic aortic aneurysm without rupture Afib    BPH (benign prostatic hyperplasia)    CAD (coronary artery disease)  pci 2016  COPD (chronic obstructive pulmonary disease)    Depression    GERD without esophagitis    HTN (hypertension)    Myocardial infarction involving other coronary artery  Proximal LAD 90%  ETIENNE on CPAP    SOB (shortness of breath) on exertion    Thoracic aortic aneurysm without rupture

## 2018-10-30 NOTE — H&P PST ADULT - REASON FOR ADMISSION
62 Yo male presents to [facility] for EP study, Afib mapping, Afib ablation, MIRIAM under sedation at EPS lab by Dr. Townsend.

## 2018-10-30 NOTE — H&P PST ADULT - HISTORY OF PRESENT ILLNESS
Three weeks ago patient went for a stress echo due to c/o sob. On the monitor patient found to have an afib and scheduled for cardiac cath and it was negative for CAD. Patient denies any c/o cp, palpitations fever, cough or dysuria. Patient c/o sob and fatigue. Ex tolerance of 1/2 fos walk with slight sob. Positive for ETIENNE. Patient is on CPAP at night. Instruction given to bring cpap machine on the DOS. Three weeks ago patient went for a stress echo due to c/o sob. On the cardiac monitor patient found to have an afib and scheduled for cardiac cath on 10/17/2018 and it was negative for CAD. Patient was started on xarelto. At presents patient denies any c/o cp, palpitations fever, cough or dysuria. Patient c/o sob and fatigue. Ex tolerance of 1/2 fos walk with slight sob. Positive for ETIENNE. Patient uses CPAP at night. Instruction given to bring cpap machine on the DOS. Three weeks ago patient went for a stress echo due to c/o sob. On the cardiac monitor patient found to have an afib and scheduled for cardiac cath on 10/17/2018 and it was negative for CAD. Patient was  on xarelto. At presents patient denies any c/o cp, palpitations fever, cough or dysuria. Patient c/o sob and fatigue. Ex tolerance of 1/2 fos walk with slight sob. Positive for ETIENNE. Patient uses CPAP at night. Instruction given to bring cpap machine on the DOS. Three weeks ago patient went for a stress echo due to c/o sob. On the cardiac monitor patient found to have an afib and scheduled for cardiac cath on 10/17/2018 and it was negative for CAD. Patient is on xarelto. At presents patient denies any c/o cp, palpitations fever, cough or dysuria. Patient c/o sob and fatigue. Ex tolerance of 1/2 fos walk with slight sob. Positive for ETIENNE. Patient uses CPAP at night. Instruction given to bring cpap machine on the DOS.

## 2018-10-30 NOTE — H&P PST ADULT - PSH
H/O hernia repair    History of percutaneous coronary intervention  2016 H/O hernia repair    History of percutaneous coronary intervention  2016 with 2 stents H/O hernia repair    History of percutaneous coronary intervention  2016 with 1 stent on Proximal LAD

## 2018-10-31 PROBLEM — I21.29 ST ELEVATION (STEMI) MYOCARDIAL INFARCTION INVOLVING OTHER SITES: Chronic | Status: ACTIVE | Noted: 2018-10-30

## 2018-10-31 PROBLEM — G47.33 OBSTRUCTIVE SLEEP APNEA (ADULT) (PEDIATRIC): Chronic | Status: INACTIVE | Noted: 2018-10-16 | Resolved: 2018-10-30

## 2018-11-07 ENCOUNTER — INPATIENT (INPATIENT)
Facility: HOSPITAL | Age: 63
LOS: 0 days | Discharge: HOME | End: 2018-11-08
Attending: INTERNAL MEDICINE | Admitting: INTERNAL MEDICINE

## 2018-11-07 VITALS — HEIGHT: 69 IN | WEIGHT: 229.94 LBS

## 2018-11-07 DIAGNOSIS — Z98.890 OTHER SPECIFIED POSTPROCEDURAL STATES: Chronic | ICD-10-CM

## 2018-11-07 DIAGNOSIS — I48.0 PAROXYSMAL ATRIAL FIBRILLATION: ICD-10-CM

## 2018-11-07 RX ORDER — GLYCOPYRROLATE AND FORMOTEROL FUMARATE 9; 4.8 UG/1; UG/1
2 AEROSOL, METERED RESPIRATORY (INHALATION)
Qty: 0 | Refills: 0 | Status: DISCONTINUED | OUTPATIENT
Start: 2018-11-07 | End: 2018-11-08

## 2018-11-07 RX ORDER — RIVAROXABAN 15 MG-20MG
20 KIT ORAL EVERY 24 HOURS
Qty: 0 | Refills: 0 | Status: DISCONTINUED | OUTPATIENT
Start: 2018-11-07 | End: 2018-11-08

## 2018-11-07 RX ORDER — METOPROLOL TARTRATE 50 MG
25 TABLET ORAL DAILY
Qty: 0 | Refills: 0 | Status: DISCONTINUED | OUTPATIENT
Start: 2018-11-07 | End: 2018-11-08

## 2018-11-07 RX ORDER — PANTOPRAZOLE SODIUM 20 MG/1
40 TABLET, DELAYED RELEASE ORAL
Qty: 0 | Refills: 0 | Status: DISCONTINUED | OUTPATIENT
Start: 2018-11-07 | End: 2018-11-08

## 2018-11-07 RX ORDER — TAMSULOSIN HYDROCHLORIDE 0.4 MG/1
0.4 CAPSULE ORAL AT BEDTIME
Qty: 0 | Refills: 0 | Status: DISCONTINUED | OUTPATIENT
Start: 2018-11-07 | End: 2018-11-08

## 2018-11-07 RX ORDER — ATORVASTATIN CALCIUM 80 MG/1
80 TABLET, FILM COATED ORAL AT BEDTIME
Qty: 0 | Refills: 0 | Status: DISCONTINUED | OUTPATIENT
Start: 2018-11-07 | End: 2018-11-08

## 2018-11-07 RX ORDER — LOSARTAN POTASSIUM 100 MG/1
50 TABLET, FILM COATED ORAL DAILY
Qty: 0 | Refills: 0 | Status: DISCONTINUED | OUTPATIENT
Start: 2018-11-07 | End: 2018-11-08

## 2018-11-07 RX ORDER — VENLAFAXINE HCL 75 MG
37.5 CAPSULE, EXT RELEASE 24 HR ORAL DAILY
Qty: 0 | Refills: 0 | Status: DISCONTINUED | OUTPATIENT
Start: 2018-11-07 | End: 2018-11-08

## 2018-11-07 RX ADMIN — RIVAROXABAN 20 MILLIGRAM(S): KIT at 19:39

## 2018-11-07 RX ADMIN — TAMSULOSIN HYDROCHLORIDE 0.4 MILLIGRAM(S): 0.4 CAPSULE ORAL at 22:22

## 2018-11-07 RX ADMIN — ATORVASTATIN CALCIUM 80 MILLIGRAM(S): 80 TABLET, FILM COATED ORAL at 22:22

## 2018-11-07 NOTE — CHART NOTE - NSCHARTNOTEFT_GEN_A_CORE
Cardiac Electrophysiology Procedure Note      	  PROCEDURE:  Electrophysiological Study  Administration of Medicines  Intracardiac Ultrasound  Right and left atrial catheterization with dual transeptal approach  CryoAblation of pulmonary veins to treat Atrial Fibrillation  CS Cannulation  Computerized 3D mapping  Fluoroscopy      INDICATION: 62 yo with symptomatic PAF      OPERATORS:    Attending	 Vipul Townsend MD    MATERIALS    Sheath	Insertion Site	Catheter	Location  12 F	RFV	Flex Cath	LA and RA  7 Fr	RFV	Daig Decapolar CS	Coronary Sinus  11 Fr	LFV	ICE	RA      ABLATION CATHETERS    Diameter	Size	Type	System  12 F	28 mm	Arctic Front Cryoballoon	Cryo      BASELINE FINDINGS    Rhythm	CL / Rate	TX	QRS	QT	AH	HV  SR	900	160	89	450	120	45      DESCRIPTION OF PROCEDURE    The patient was brought to the Procedure Room in a nonsedated and fasting state. Informed, written consent was obtained prior to the procedure. Anesthesia maintain comfort and analgesia throughout the procedure. Blood pressure, oxygenation and level of comfort were stable throughout. The right and left groin and right neck regions were cleaned and prepped with the applications of Chloraprep. Patient was then covered from head to toe with sterile drapes in the usual manner.     The left right inguinal areas and arterial pulse were defined; 30 cc of lidocaine solution were infiltrated into the skin overlying the area.     Next, using needle and guidewire, the right femoral vein was accessed once using modified Seldinger technique. All access was obtain using ultrasound guidance. The guidewires were followed up the IVC, right of the spine, to confirm venous access. One introducer sheath was placed into the femoral vein. The dilators and guidewires were removed. Then, using needle and guidewire, the left femoral vein was accessed Twice using modified Seldinger technique. The guidewires were followed up the IVC, right of the spine, to confirm venous access. Two introducer sheaths were placed into the femoral vein. The dilators and guidewires were removed. Esophageal temperature was monitored using esoahpgageal temperature probe. If Temperature decreased below 25C ablation was terminated.     Catheters were placed in the coronary sinus, at the high right atrial position, over the septal tricuspid valve area to obtain and confirm a proximal His signal, and at the RV apex. Diastolic thresholds were found to be adequate.   	  Baseline parameters were obtained and intervals were measured at these sites. Pacing at the HRA was performed to evaluate, AVN function and possible arrhythmia induction. Pacing performed at the RV apex to evaluate retrograde AVN function and evaluate for possible accessory pathways by evaluating retrograde atrial activation pattern and VH to A relationship.    A 10F AcuNav ICE ultrasound catheter was placed through the 11F sheath and positioned into the right atrium to allow visualization of the intra-atrial septum. One SL-1 sheaths were exchanged for the three 8F introducer sheath previously inserted in the right femoral vein. Single transeptal puncture was performed with BRK1 needle and one [facility] 8 F degree SL-1 sheaths via the right femoral vein under direct visualization with intracardiac echocardiography and  fluoroscopy guidance. In both instances, a J wire was followed to the left subclavian vein and the sheath and dilator combo inserted to that level. The wire was exchanged for the BRK1 needle and pulled back under fluoroscopic visualization until the interatrial septum was reached. On all occasions, the sheath needle combo was placed over the septum and into the left atrium with needle advancement. Left atrial location was confirmed for each transeptal puncture with pressure monitoring, micro-bubble confirmation on ICE, after withdrawal of bright red blood from the catheter and with advancement of a guide wire into the left pulmonary vein. Left atrial pressure was measured at 12 mmHg. The SL-1 sheaths were exchanged over a J wire located in the left pulmonary vein for the Medtronic FlexCath adjustable catheter. Intravenous heparin was given prior to performing transeptal puncture and ACTs followed during the rest of the procedure to ensure an ACT greater than 300 secs.     Pulmonary veins diameter was assessed using a combination of ICE. A Lasso catheter was placed in the left atria and sequential mapping and ablation performed in the pulmonary veins using ICE and fluoroscopy guidance. JUAN 3D mapping was utilized to assess the position of the lasso catheter and visualized the CT scan of the LA to assess PV branches. Esophageal temperature was measure using a esophageal temperature probe and if temperature reached below 25 degrees, freezing was immediately terminated.    The achieve was inserted in to the one of the branches of each PV. The cryoballon (28 mm) was inflated and advanced to the os of each PV. Dye was injected and pressure was recorded to confirm adequate occlusion. Each PV was isolated. During the ablation, the patient did not experience symptoms. Each freeze was no longer than 3 minutes. If the slope of temperature descent was too steep or the temperature was < - 50C the ablation was terminated. All PV potentials were eradicated. Exit block was documented by pacing from the each pole of the lasso catheter and Entrance block was evident when pacing form the distal CS. Refer  to table bellow for details of each freeze.     During the cryoablation of the right superior and inferior veins, CS catheter was inserted into the superior vena cava and the right phrenic nerve was paced at 1000ms cycle length. The movement of the diaphragm was manually monitored during the entire freeze and if the strength of contraction decreased the cryo was terminated.     PV	TTI	Number of freezes	Temperature (-C)	Isolation  LSPV	74,30 	             3	44,47,44           	Yes  LIPV	80	             2	47,42	              Yes  RSPV	27	             4	31,36,27,47	Yes  RIPV	36	             4	25,41,31,42	Yes    Ventricular pacing showed VA conduction at <300 ms CL. Atrial pacing showed AVN WBCL was 290 msec. The PV velocity by ICE Doppler was 0.4 m/sec for the LSPV and 0.5 m/sec for the LIPV at the end of the procedure. A limited transthorasic echocardiogram was performed and no evidence of significant pericardial effusion was seen.    The catheters were removed and the sheaths pulled after a figure-8 stitch was placed. A dry, sterile dressing was placed over this. The patient was returned to a hospital room in stable condition. Gauze and needle count were performed and found to be consistent at the end of the procedure      COMPLICATIONS:    The patient tolerated the procedure well. There were no immediate complications. No evidence of pulmonary vein stenosis.      CONCLUSIONS:    Successful Cryoablation of the left superior, right superior, left inferior and right inferior pulmonary veins with ablation of PV potentials and entrance / exit block as endpoints. No evidence of induced atrial fibrillation               _______________________________  Vipul Townsend MD  Cardiac Electrophysiology

## 2018-11-07 NOTE — PROVIDER CONTACT NOTE (OTHER) - ACTION/TREATMENT ORDERED:
Pressure applied and sites cleaned. Patient assessed by  No other medical intervention at this time .Patient on bedrest until morning.

## 2018-11-07 NOTE — PRE-ANESTHESIA EVALUATION ADULT - NSANTHDISPORD_GEN_ALL_CORE
Preferred tel# [phone]  Patient would like to speak with [facility] [place] regarding medications. He states there is a mix up and is frustrated with the situation.  Pt states that Losartan was already a medication that he was taken off of and doesn't know why that w PACU

## 2018-11-08 ENCOUNTER — TRANSCRIPTION ENCOUNTER (OUTPATIENT)
Age: 63
End: 2018-11-08

## 2018-11-08 VITALS
TEMPERATURE: 97 F | RESPIRATION RATE: 18 BRPM | HEART RATE: 72 BPM | SYSTOLIC BLOOD PRESSURE: 136 MMHG | DIASTOLIC BLOOD PRESSURE: 78 MMHG

## 2018-11-08 RX ORDER — PANTOPRAZOLE SODIUM 20 MG/1
1 TABLET, DELAYED RELEASE ORAL
Qty: 0 | Refills: 0 | DISCHARGE
Start: 2018-11-08

## 2018-11-08 RX ORDER — ATORVASTATIN CALCIUM 80 MG/1
1 TABLET, FILM COATED ORAL
Qty: 0 | Refills: 0 | DISCHARGE
Start: 2018-11-08

## 2018-11-08 RX ADMIN — Medication 37.5 MILLIGRAM(S): at 12:49

## 2018-11-08 RX ADMIN — PANTOPRAZOLE SODIUM 40 MILLIGRAM(S): 20 TABLET, DELAYED RELEASE ORAL at 06:17

## 2018-11-08 RX ADMIN — LOSARTAN POTASSIUM 50 MILLIGRAM(S): 100 TABLET, FILM COATED ORAL at 05:29

## 2018-11-08 RX ADMIN — Medication 25 MILLIGRAM(S): at 05:29

## 2018-11-08 NOTE — DISCHARGE NOTE ADULT - CARE PROVIDER_API CALL
Vipul Townsend; HÉCTOR), Cardiac Electrophysiology  [address] [place] [address]  Phone: [phone]  Fax: [phone]

## 2018-11-08 NOTE — DISCHARGE NOTE ADULT - PATIENT PORTAL LINK FT
You can access the [facility] Patient Portal, offered by [facility], by registering with the following website: [email or web address]

## 2018-11-08 NOTE — PROGRESS NOTE ADULT - SUBJECTIVE AND OBJECTIVE BOX
INTERVAL HPI/OVERNIGHT EVENTS:    Patient s/p     Afib        ablation.   No events over night  Patietn in NSR    MEDICATIONS  (STANDING):  atorvastatin 80 milliGRAM(s) Oral at bedtime  glycopyrrolate 9 MICROgram(s)/formoterol 4.8 MICROgram(s)Inhaler 2 Puff(s) Inhalation two times a day  losartan 50 milliGRAM(s) Oral daily  metoprolol succinate ER 25 milliGRAM(s) Oral daily  pantoprazole    Tablet 40 milliGRAM(s) Oral before breakfast  rivaroxaban 20 milliGRAM(s) Oral every 24 hours  tamsulosin 0.4 milliGRAM(s) Oral at bedtime  venlafaxine XR. 37.5 milliGRAM(s) Oral daily    MEDICATIONS  (PRN):      Allergies    No Known Allergies    Intolerances          Vital Signs Last 24 Hrs  T(C): 36.1 (08 Nov 2018 05:40), Max: 36.7 (07 Nov 2018 20:23)  T(F): 96.9 (08 Nov 2018 05:40), Max: 98.1 (07 Nov 2018 20:23)  HR: 72 (08 Nov 2018 05:40) (72 - 92)  BP: 122/67 (08 Nov 2018 05:40) (122/67 - 163/95)  BP(mean): --  RR: 18 (08 Nov 2018 05:40) (17 - 18)  SpO2: --    HEENT LYNNE EOMI  Lung CTAB  Heart RRR normal S1 S2  abd +BS soft  groins No hematoma, no bleeding; stiches removed, ecchymosis noted b/l groin NO Hematoma  Ext no C/C/E                    A/P  Patient S/P   Afib   Ablation  Mercedes is doing well  - continue  Xarelto   - protonix 40 mg daily x 30 days  - No heavy lifting  - FU in office in 3-4 weeks
Electrophysiology Brief Post-Op Note      I have personally seen and examined the patient.  I agree with the history and physical which I have reviewed and noted any changes below.  11-07-18 @ 12:00    PRE-OP DIAGNOSIS: AF    POST-OP DIAGNOSIS: AF    PROCEDURE: AF ablation    Physician: Kristian  Assistant: None    ANESTHESIA TYPE:  [ X ]General Anesthesia  [  ] Sedation  [  ] Local/Regional    ESTIMATED BLOOD LOSS: 40      mL    CONDITION  [  ] Critical  [  ] Serious  [  ]Fair  [ X ]Good      SPECIMENS REMOVED (IF APPLICABLE):  none    IMPLANTS (IF APPLICABLE)  none    FINDINGS  PLAN OF CARE  -	Start Xarelto 20 mg qd tonight at 10 pm  -	Start protonix 40 mg daily tonight  -	Bed rest till am  -	Admit to telemetry

## 2018-11-08 NOTE — DISCHARGE NOTE ADULT - MEDICATION SUMMARY - MEDICATIONS TO TAKE
I will START or STAY ON the medications listed below when I get home from the hospital:    losartan 50 mg oral tablet  -- 1 tab(s) by mouth once a day  -- Indication: For HTN    tamsulosin 0.4 mg oral capsule  -- 1 cap(s) by mouth once a day  -- Indication: For BPH    Xarelto 20 mg oral tablet  -- 1 tab(s) by mouth once a day (in the evening)  -- Indication: For Paroxysmal atrial fibrillation    venlafaxine 37.5 mg oral capsule, extended release  -- 1 cap(s) by mouth once a day  -- Indication: For antidepressant    Crestor 20 mg oral tablet  -- 1 tab(s) by mouth once a day (at bedtime)  -- Indication: For cholesterol    atorvastatin 80 mg oral tablet  -- 1 tab(s) by mouth once a day (at bedtime)  -- Indication: For cholesterol    metoprolol succinate 25 mg oral tablet, extended release  -- 1 tab(s) by mouth once a day  -- Indication: For Paroxysmal atrial fibrillation    Bevespi Aerosphere 9 mcg-4.8 mcg/inh inhalation aerosol  -- 2 puff(s) inhaled 2 times a day  -- Indication: For I48.0 / CPT 49395 / 56536 / 49752 / 38965 / 44929 / 25546    pantoprazole 40 mg oral delayed release tablet  -- 1 tab(s) by mouth once a day (before a meal)  -- Indication: For GERD

## 2018-11-08 NOTE — DISCHARGE NOTE ADULT - CARE PLAN
Principal Discharge DX:	Atrial fibrillation, unspecified type  Goal:	No palpitations by follow up.  Assessment and plan of treatment:	no complaints of palpitations on follow up

## 2018-11-13 DIAGNOSIS — K21.9 GASTRO-ESOPHAGEAL REFLUX DISEASE WITHOUT ESOPHAGITIS: ICD-10-CM

## 2018-11-13 DIAGNOSIS — Z79.01 LONG TERM (CURRENT) USE OF ANTICOAGULANTS: ICD-10-CM

## 2018-11-13 DIAGNOSIS — I25.2 OLD MYOCARDIAL INFARCTION: ICD-10-CM

## 2018-11-13 DIAGNOSIS — I10 ESSENTIAL (PRIMARY) HYPERTENSION: ICD-10-CM

## 2018-11-13 DIAGNOSIS — G47.33 OBSTRUCTIVE SLEEP APNEA (ADULT) (PEDIATRIC): ICD-10-CM

## 2018-11-13 DIAGNOSIS — I25.10 ATHEROSCLEROTIC HEART DISEASE OF NATIVE CORONARY ARTERY WITHOUT ANGINA PECTORIS: ICD-10-CM

## 2018-11-13 DIAGNOSIS — N40.0 BENIGN PROSTATIC HYPERPLASIA WITHOUT LOWER URINARY TRACT SYMPTOMS: ICD-10-CM

## 2018-11-13 DIAGNOSIS — F32.9 MAJOR DEPRESSIVE DISORDER, SINGLE EPISODE, UNSPECIFIED: ICD-10-CM

## 2018-11-13 PROBLEM — Z00.00 ENCOUNTER FOR PREVENTIVE HEALTH EXAMINATION: Status: ACTIVE | Noted: 2018-11-13

## 2018-11-20 PROBLEM — G47.33 OBSTRUCTIVE SLEEP APNEA (ADULT) (PEDIATRIC): Chronic | Status: ACTIVE | Noted: 2018-10-30

## 2018-11-20 PROBLEM — K21.9 GASTRO-ESOPHAGEAL REFLUX DISEASE WITHOUT ESOPHAGITIS: Chronic | Status: ACTIVE | Noted: 2018-10-30

## 2018-11-20 PROBLEM — R06.02 SHORTNESS OF BREATH: Chronic | Status: ACTIVE | Noted: 2018-10-30

## 2018-11-20 NOTE — ASU PATIENT PROFILE, ADULT - PMH
Afib    BPH (benign prostatic hyperplasia)    CAD (coronary artery disease)  pci 2016  COPD (chronic obstructive pulmonary disease)    Depression    GERD without esophagitis    HTN (hypertension)    Myocardial infarction involving other coronary artery  Proximal LAD 90%  ETIENNE on CPAP    SOB (shortness of breath) on exertion    Thoracic aortic aneurysm without rupture

## 2018-11-20 NOTE — ASU PATIENT PROFILE, ADULT - PSH
H/O hernia repair    History of percutaneous coronary intervention  2016 with 1 stent on Proximal LAD

## 2018-11-21 ENCOUNTER — OUTPATIENT (OUTPATIENT)
Dept: OUTPATIENT SERVICES | Facility: HOSPITAL | Age: 63
LOS: 1 days | Discharge: HOME | End: 2018-11-21

## 2018-11-21 VITALS
OXYGEN SATURATION: 96 % | TEMPERATURE: 98 F | RESPIRATION RATE: 18 BRPM | SYSTOLIC BLOOD PRESSURE: 120 MMHG | HEIGHT: 69 IN | WEIGHT: 238.1 LBS | DIASTOLIC BLOOD PRESSURE: 77 MMHG | HEART RATE: 100 BPM

## 2018-11-21 DIAGNOSIS — I48.0 PAROXYSMAL ATRIAL FIBRILLATION: ICD-10-CM

## 2018-11-21 DIAGNOSIS — Z98.890 OTHER SPECIFIED POSTPROCEDURAL STATES: Chronic | ICD-10-CM

## 2018-11-21 RX ORDER — TAMSULOSIN HYDROCHLORIDE 0.4 MG/1
1 CAPSULE ORAL
Qty: 0 | Refills: 0 | COMMUNITY

## 2018-11-21 RX ORDER — ROSUVASTATIN CALCIUM 5 MG/1
1 TABLET ORAL
Qty: 0 | Refills: 0 | COMMUNITY

## 2018-11-21 NOTE — PROGRESS NOTE ADULT - SUBJECTIVE AND OBJECTIVE BOX
PREOPERATIVE DAY OR PROCEDURE EVALUATION    I have personally seen and examined the patient.  I agree with the history and physical which I have reviewed and noted any changes below.  (ELECTRONICALLY SIGNED: - [id number] @ 12:35)                                                              POST OPERATIVE PROCEDURAL DOCUMENTATION  PRE-OP DIAGNOSIS: Afib     POST-OP DIAGNOSIS NSR Bicuspid Aortic valve     PROCEDURE: Transesophageal echocardiogram    Primary Physician: Agustín Ryder MD  Fellow: Radha     ANESTHESIA TYPE  [  ] General Anesthesia  [x ] Conscious Sedation  [  ] Local/Regional    CONDITION  [  ] Critical  [  ] Serious  [  ] Fair  [ x ] Good    SPECIMENS REMOVED (IF APPLICABLE): NA    IMPLANTS (IF APPLICABLE): None    ESTIMATED BLOOD LOSS: None    COMPLICATIONS: None    FINDINGS  LVEf 60-65%   Bicuspid Aortic valve trivial AI Dilated aortic root   GARRET no evidence of thrombus decreased PW velocities   no LA thrombus   IAS aneurysm with PFO (post afib ablation )   trivial MR  trivial TR   DIAGNOSIS/IMPRESSION:      PLAN OF CARE:  the patinet underwent biphasic synchronized electrical cardioversion with 360 J x 1 attempt converted to NSR

## 2020-05-19 NOTE — PATIENT PROFILE ADULT - NSPROEDAABILITYLEARNOTHER_GEN_A_NUR
Pharmacy: [facility] HFA INH W/DOS  PUFFS  Qty: 18  Sig: Inhale 2 puffs into the lungs every 6 hours as needed for wheezing. none

## 2021-04-05 ENCOUNTER — OUTPATIENT (OUTPATIENT)
Dept: OUTPATIENT SERVICES | Facility: HOSPITAL | Age: 66
LOS: 1 days | Discharge: HOME | End: 2021-04-05

## 2021-04-05 DIAGNOSIS — Z11.59 ENCOUNTER FOR SCREENING FOR OTHER VIRAL DISEASES: ICD-10-CM

## 2021-04-05 DIAGNOSIS — Z98.890 OTHER SPECIFIED POSTPROCEDURAL STATES: Chronic | ICD-10-CM

## 2021-09-20 ENCOUNTER — OUTPATIENT (OUTPATIENT)
Dept: OUTPATIENT SERVICES | Facility: HOSPITAL | Age: 66
LOS: 1 days | Discharge: HOME | End: 2021-09-20

## 2021-09-20 DIAGNOSIS — Z98.890 OTHER SPECIFIED POSTPROCEDURAL STATES: Chronic | ICD-10-CM

## 2021-09-20 DIAGNOSIS — Z11.59 ENCOUNTER FOR SCREENING FOR OTHER VIRAL DISEASES: ICD-10-CM

## 2021-09-22 ENCOUNTER — OUTPATIENT (OUTPATIENT)
Dept: OUTPATIENT SERVICES | Facility: HOSPITAL | Age: 66
LOS: 1 days | Discharge: HOME | End: 2021-09-22

## 2021-09-22 VITALS
RESPIRATION RATE: 12 BRPM | OXYGEN SATURATION: 95 % | HEIGHT: 68 IN | SYSTOLIC BLOOD PRESSURE: 140 MMHG | DIASTOLIC BLOOD PRESSURE: 75 MMHG | HEART RATE: 65 BPM | WEIGHT: 250 LBS

## 2021-09-22 DIAGNOSIS — Z98.890 OTHER SPECIFIED POSTPROCEDURAL STATES: Chronic | ICD-10-CM

## 2021-09-22 LAB
ANION GAP SERPL CALC-SCNC: 11 MMOL/L — SIGNIFICANT CHANGE UP (ref 7–14)
BUN SERPL-MCNC: 17 MG/DL — SIGNIFICANT CHANGE UP (ref 10–20)
CALCIUM SERPL-MCNC: 8.7 MG/DL — SIGNIFICANT CHANGE UP (ref 8.5–10.1)
CHLORIDE SERPL-SCNC: 104 MMOL/L — SIGNIFICANT CHANGE UP (ref 98–110)
CO2 SERPL-SCNC: 24 MMOL/L — SIGNIFICANT CHANGE UP (ref 17–32)
CREAT SERPL-MCNC: 1 MG/DL — SIGNIFICANT CHANGE UP (ref 0.7–1.5)
GLUCOSE SERPL-MCNC: 96 MG/DL — SIGNIFICANT CHANGE UP (ref 70–99)
HCT VFR BLD CALC: 41.5 % — LOW (ref 42–52)
HGB BLD-MCNC: 14 G/DL — SIGNIFICANT CHANGE UP (ref 14–18)
MCHC RBC-ENTMCNC: 31.3 PG — HIGH (ref 27–31)
MCHC RBC-ENTMCNC: 33.7 G/DL — SIGNIFICANT CHANGE UP (ref 32–37)
MCV RBC AUTO: 92.6 FL — SIGNIFICANT CHANGE UP (ref 80–94)
NRBC # BLD: 0 /100 WBCS — SIGNIFICANT CHANGE UP (ref 0–0)
PLATELET # BLD AUTO: 152 K/UL — SIGNIFICANT CHANGE UP (ref 130–400)
POTASSIUM SERPL-MCNC: 4.3 MMOL/L — SIGNIFICANT CHANGE UP (ref 3.5–5)
POTASSIUM SERPL-SCNC: 4.3 MMOL/L — SIGNIFICANT CHANGE UP (ref 3.5–5)
RBC # BLD: 4.48 M/UL — LOW (ref 4.7–6.1)
RBC # FLD: 13.5 % — SIGNIFICANT CHANGE UP (ref 11.5–14.5)
SODIUM SERPL-SCNC: 139 MMOL/L — SIGNIFICANT CHANGE UP (ref 135–146)
WBC # BLD: 7.08 K/UL — SIGNIFICANT CHANGE UP (ref 4.8–10.8)
WBC # FLD AUTO: 7.08 K/UL — SIGNIFICANT CHANGE UP (ref 4.8–10.8)

## 2021-09-22 RX ORDER — LOSARTAN POTASSIUM 100 MG/1
1 TABLET, FILM COATED ORAL
Qty: 0 | Refills: 0 | DISCHARGE

## 2021-09-22 RX ORDER — VENLAFAXINE HCL 75 MG
1 CAPSULE, EXT RELEASE 24 HR ORAL
Qty: 0 | Refills: 0 | DISCHARGE

## 2021-09-22 RX ORDER — METOPROLOL TARTRATE 50 MG
1 TABLET ORAL
Qty: 0 | Refills: 0 | DISCHARGE

## 2021-09-22 NOTE — H&P CARDIOLOGY - HISTORY OF PRESENT ILLNESS
65 y/old M here for [facility] due to dizziness, palpitations, MCLEOD  PMH: CAD PCI 2016 with stent to LAD - NSTEMI, A-Fib - Ablation f/b Cardioversion, COPD, Emphysema, ETIENNE - CPAP at night    PSH: A-FIb Ablation  FH: CHF - Mother, Father - MI    Pre cath note:    indication:  [ ] STEMI                [ ] NSTEMI                 [ ] Acute coronary syndrome                     [ ]Unstable Angina   [ ] high risk  [ ] intermediate risk  [ ] low risk                     [x ] Stable Angina     non-invasive testing:                          Date:                     result: [ ] high risk  [x ] intermediate risk  [ ] low risk    Anti- Anginal medications:                    [ ] not used                       [x used                   [ ] not used but strong indication not to use    Ejection Fraction                   [ ] <29            [ ] 30-39%   [ ] 40-49%     [x ]>50%    CHF                   [ ] active (within last 14 days on meds   [ ] Chronic (on meds but no exacerbation)    COPD                   [ ] mild (on chronic bronchodilators)  [x ] moderate (on chronic steroid therapy)      [ ] severe (indication for home O2 or PACO2 >50)    Other risk factors:                       [x ] Previous MI                     [ ] CVA/ stroke                    [ ] carotid stent/ CEA                    [ ] PVD/PAD- (arterial aneurysm, non-palpable pulses, tortuous vessel with inability to insert catheter, infra-renal dissection, renal or subclavian artery stenosis)                    [ ] diabetic                    [ ] previous CABG                    [ ] Renal Failure     Adjusted CathPCI Bleeding Event Risk: 0.7  %  Eh Test ( Normal )

## 2021-09-22 NOTE — ASU PATIENT PROFILE, ADULT - NSICDXPASTSURGICALHX_GEN_ALL_CORE_FT
PAST SURGICAL HISTORY:  H/O hernia repair     History of percutaneous coronary intervention 2016 with 1 stent on Proximal LAD

## 2021-09-22 NOTE — H&P CARDIOLOGY - SPO2 (%)
Lesion does appear to have bacterial component as with impetigo, but also has distribution pattern of possible ringworm lesion.  Due to likely exposure to both we will treat as both.  Start with the mupirocin ointment first to treat bacterial component. Due to localization of lesions, I do think it can be treated with topical medications without oral antibiotics.  Bath daily with antibacterial soap and water.  Keep lesions covered while at school with bandage.  Wash hands frequently.  You should return to clinic or see PCP if lesions worsen or for other concerns.      Treat cough and congestion with Bromfed DM.   Most likely due to allergies and should improve with use of med as it also has an antihistamine in med.  Increase fluids.  Return to clinic or see PCP with worsened symptoms, fever, etc.         Impetigo, Pediatric  Impetigo is an infection of the skin. It is most common in babies and children. The infection causes blisters on the skin. The blisters usually occur on the face but can also affect other areas of the body. Impetigo usually goes away in 7-10 days with treatment.  What are the causes?  Impetigo is caused by two types of bacteria. It may be caused by staphylococci or streptococci bacteria. These bacteria cause impetigo when they get under the surface of the skin. This often happens after some damage to the skin, such as damage from:  · Cuts, scrapes, or scratches.  · Insect bites, especially when children scratch the area of a bite.  · Chickenpox.  · Nail biting or chewing.    Impetigo is contagious and can spread easily from one person to another. This may occur through close skin contact or by sharing towels, clothing, or other items with a person who has the infection.  What increases the risk?  Babies and young children are most at risk of getting impetigo. Some things that can increase the risk of getting this infection include:  · Being in school or day care settings that are  crowded.  · Playing sports that involve close contact with other children.  · Having broken skin, such as from a cut.    What are the signs or symptoms?  Impetigo usually starts out as small blisters, often on the face. The blisters then break open and turn into tiny sores (lesions) with a yellow crust. In some cases, the blisters cause itching or burning. With scratching, irritation, or lack of treatment, these small areas may get larger. Scratching can also cause impetigo to spread to other parts of the body. The bacteria can get under the fingernails and spread when the child touches another area of his or her skin.  Other possible symptoms include:  · Larger blisters.  · Pus.  · Swollen lymph glands.    How is this diagnosed?  The health care provider can usually diagnose impetigo by performing a physical exam. A skin sample or sample of fluid from a blister may be taken for lab tests that involve growing bacteria (culture test). This can help confirm the diagnosis or help determine the best treatment.  How is this treated?  Mild impetigo can be treated with prescription antibiotic cream. Oral antibiotic medicine may be used in more severe cases. Medicines for itching may also be used.  Follow these instructions at home:  · Give medicines only as directed by your child's health care provider.  · To help prevent impetigo from spreading to other body areas:  ? Keep your child's fingernails short and clean.  ? Make sure your child avoids scratching.  ? Cover infected areas if necessary to keep your child from scratching.  · Gently wash the infected areas with antibiotic soap and water.  · Soak crusted areas in warm, soapy water using antibiotic soap.  ? Gently rub the areas to remove crusts. Do not scrub.  · Wash your hands and your child's hands often to avoid spreading this infection.  · Keep your child home from school or day care until he or she has used an antibiotic cream for 48 hours (2 days) or an oral  antibiotic medicine for 24 hours (1 day). Also, your child should only return to school or day care if his or her skin shows significant improvement.  How is this prevented?  To keep the infection from spreading:  · Keep your child home until he or she has used an antibiotic cream for 48 hours or an oral antibiotic for 24 hours.  · Wash your hands and your child's hands often.  · Do not allow your child to have close contact with other people while he or she still has blisters.  · Do not let other people share your child's towels, washcloths, or bedding while he or she has the infection.    Contact a health care provider if:  · Your child develops more blisters or sores despite treatment.  · Other family members get sores.  · Your child's skin sores are not improving after 48 hours of treatment.  · Your child has a fever.  · Your baby who is younger than 3 months has a fever lower than 100°F (38°C).  Get help right away if:  · You see spreading redness or swelling of the skin around your child's sores.  · You see red streaks coming from your child's sores.  · Your baby who is younger than 3 months has a fever of 100°F (38°C) or higher.  · Your child develops a sore throat.  · Your child is acting ill (lethargic, sick to his or her stomach).  This information is not intended to replace advice given to you by your health care provider. Make sure you discuss any questions you have with your health care provider.  Document Released: 12/15/2001 Document Revised: 05/25/2017 Document Reviewed: 03/25/2015  [employer] Interactive Patient Education © 2017 [employer]     95

## 2021-09-22 NOTE — ASU PATIENT PROFILE, ADULT - NSICDXPASTMEDICALHX_GEN_ALL_CORE_FT
PAST MEDICAL HISTORY:  Afib     BPH (benign prostatic hyperplasia)     CAD (coronary artery disease) pci 2016    COPD (chronic obstructive pulmonary disease)     Depression     GERD without esophagitis     HTN (hypertension)     Myocardial infarction involving other coronary artery Proximal LAD 90%    ETIENNE on CPAP     SOB (shortness of breath) on exertion     Thoracic aortic aneurysm without rupture

## 2021-09-22 NOTE — CHART NOTE - NSCHARTNOTEFT_GEN_A_CORE
Preliminary Cardiac Cath Report:    Pre- procedure diagnosis: SIHD; suspected ISR; CCS class III;   Post procedure diagnosis: SIHD; suspected ISR; CCS class III;     PROCEDURE:     Left Heart Catheterization     Selective Coronary Angiogram    Primary Physician:  Dr. Price     Cardiac Fellow:  Dr Butler     ANESTHESIA TYPE: Sedation/Local     ESTIMATED BLOOD LOSS:  Less than 10 cc    Access: left radial artery     CONDITION: Good    FINDINGS OF PROCEDURE:  Left Heart Catheterization: LVEDP: 15 mmHg  Aortic valve: no gradient    Coronary Angiogram  The coronary circulation is right dominant.    LEFT MAIN: short and no disease   LAD: minor luminal irregularities; patent stent in mid LAD   DIAG1: normal   LCX: Normal  OM1: minor luminal irregularities   OM2: minor luminal irregularities   RCA: minor luminal irregularities   rPDA: minor luminal irregularities     Contrast: omni 40 ml    Sheath removed  Hemostasis was achieved with D stat   Transferred to hold area in hemodynamically stable condition     Complications:  None    Impression  Non obstructive CAD  No angiographic evidence of significant restenosis within the previously stented segment.    Plan  Discharge home when hemodynamically stable as per protocol   Continue medical therapy  Office visit in 2-3 weeks

## 2021-09-30 DIAGNOSIS — I48.91 UNSPECIFIED ATRIAL FIBRILLATION: ICD-10-CM

## 2021-09-30 DIAGNOSIS — I25.2 OLD MYOCARDIAL INFARCTION: ICD-10-CM

## 2021-09-30 DIAGNOSIS — I25.10 ATHEROSCLEROTIC HEART DISEASE OF NATIVE CORONARY ARTERY WITHOUT ANGINA PECTORIS: ICD-10-CM

## 2021-09-30 DIAGNOSIS — I10 ESSENTIAL (PRIMARY) HYPERTENSION: ICD-10-CM

## 2021-09-30 DIAGNOSIS — Z98.61 CORONARY ANGIOPLASTY STATUS: ICD-10-CM

## 2021-09-30 DIAGNOSIS — Z79.01 LONG TERM (CURRENT) USE OF ANTICOAGULANTS: ICD-10-CM

## 2021-10-14 ENCOUNTER — FORM ENCOUNTER (OUTPATIENT)
Age: 66
End: 2021-10-14

## 2021-10-15 ENCOUNTER — TRANSCRIPTION ENCOUNTER (OUTPATIENT)
Age: 66
End: 2021-10-15

## 2021-10-16 ENCOUNTER — OUTPATIENT (OUTPATIENT)
Dept: INPATIENT UNIT | Facility: HOSPITAL | Age: 66
LOS: 1 days | Discharge: HOME | End: 2021-10-16

## 2021-10-16 ENCOUNTER — APPOINTMENT (OUTPATIENT)
Age: 66
End: 2021-10-16

## 2021-10-16 VITALS
SYSTOLIC BLOOD PRESSURE: 99 MMHG | OXYGEN SATURATION: 95 % | TEMPERATURE: 98 F | DIASTOLIC BLOOD PRESSURE: 60 MMHG | RESPIRATION RATE: 16 BRPM | HEART RATE: 59 BPM

## 2021-10-16 VITALS
RESPIRATION RATE: 17 BRPM | SYSTOLIC BLOOD PRESSURE: 103 MMHG | OXYGEN SATURATION: 96 % | TEMPERATURE: 98 F | DIASTOLIC BLOOD PRESSURE: 61 MMHG | HEART RATE: 70 BPM

## 2021-10-16 DIAGNOSIS — Z98.890 OTHER SPECIFIED POSTPROCEDURAL STATES: Chronic | ICD-10-CM

## 2021-10-16 DIAGNOSIS — U07.1 COVID-19: ICD-10-CM

## 2021-10-16 RX ORDER — SODIUM CHLORIDE 9 MG/ML
250 INJECTION INTRAMUSCULAR; INTRAVENOUS; SUBCUTANEOUS
Refills: 0 | Status: COMPLETED | OUTPATIENT
Start: 2021-10-16 | End: 2021-10-16

## 2021-10-16 RX ADMIN — SODIUM CHLORIDE 310 MILLILITER(S): 9 INJECTION INTRAMUSCULAR; INTRAVENOUS; SUBCUTANEOUS at 11:22

## 2021-10-16 NOTE — MONOCLONAL ANTIBODY INFUSION - HOME MEDICATIONS
DULoxetine 60 mg oral delayed release capsule , 1 cap(s) orally once a day  tamsulosin 0.4 mg oral capsule , 1 cap(s) orally once a day  rosuvastatin 20 mg oral tablet , 1 tab(s) orally once a day  Multaq 400 mg oral tablet , 1 tab(s) orally 2 times a day  irbesartan 300 mg oral tablet , 1 tab(s) orally once a day  Norvasc 2.5 mg oral tablet , 1 tab(s) orally once a day  Bevespi Aerosphere 9 mcg-4.8 mcg/inh inhalation aerosol , 2 puff(s) inhaled 2 times a day  Xarelto 20 mg oral tablet , 1 tab(s) orally once a day (in the evening)

## 2021-10-16 NOTE — MONOCLONAL ANTIBODY INFUSION - ASSESSMENT AND PLAN
67 yo M PMH COPD, PAfib (s/p ablation, on Xarelto), HLD. Presents with fever (Tmax 100F), and malaise x 3 days. Positive COVID PCR yesterday. Denies Receving COVID vaccine. Denies SOB, chest pain, N/V/D, cough, dizziness, weakness, and any other acute complaints at this time.     Plan:  I have reviewed the Casirivimab/Imdevimab Emergency Use Authorization (EUA) and I have provided the patient or patient's caregiver with the following information:  1. FDA has authorized emergency use of Casirivimab/Imdevimab which is not an FDA approved biological agent.  2. The patient or patient’s caregiver has the option to accept or refuse administration of Casirivimab/Imdevimab.  3. The significant known and potential risks and benefits of Casirivimab/Imdevimab and the extent to which such risks and benefits are unknown.  4. Information on available alternative treatments and risks and benefits of those alternatives.  Informed consent was obtained. Answered all of patient's questions and concerns to their satisfaction. Patient verbalized understanding.  Monitor VS per protocol.  Insert peripheral IV.  Infuse NSS 25/mL IV continuously.  Administer casirivimab/imdevimab(600mg/600mg) IV over 30 minutes   Observe patient for 1 hour post infusion.    Disposition:  Patient tolerated infusion well, denies complaints of chest pain, shortness of breath, dizziness or palpitations. Discharge instructions given and fact sheet included.  Instructed patient to contact the call center or their PMD if they develop side effects at home.  Instructed the patient to call 911 and go to the emergency room if they develop symptoms of a severe allergic reaction. Patient verbalized understanding.  RN educated patient on the proper use of the incentive spirometer and the patient displayed return demonstration.  VSS and RN removed IV successfully.  Patient was discharged home in [facility].    Irving SERRANO

## 2021-10-17 ENCOUNTER — TRANSCRIPTION ENCOUNTER (OUTPATIENT)
Age: 66
End: 2021-10-17

## 2022-03-03 NOTE — PATIENT PROFILE ADULT - NSPROPTRIGHTBILLOFRIGHTS_GEN_A_NUR
We have made several calls to pt about outstanding lab orders from August. Letter mailed to pt.    patient

## 2022-03-08 ENCOUNTER — APPOINTMENT (OUTPATIENT)
Dept: SURGERY | Facility: CLINIC | Age: 67
End: 2022-03-08
Payer: COMMERCIAL

## 2022-03-08 VITALS — HEIGHT: 69 IN | WEIGHT: 248 LBS | BODY MASS INDEX: 36.73 KG/M2

## 2022-03-08 PROCEDURE — 99243 OFF/OP CNSLTJ NEW/EST LOW 30: CPT

## 2022-03-08 NOTE — ASSESSMENT
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 He has gained a significant amount of weight over the last 7 years. 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 There is no evidence of incarceration or strangulation, and the patient denies any symptoms of obstruction.  This hernia is complicated by a moderate to large diastasis recti which is likely related to his excess abdominal weight and protuberant abdomen. There is no evidence of a recurrent ventral hernia. His current BMI is 37.\par \par I explained the pros and cons of surgery, as well as all risks, benefits, indications and alternatives of the procedure and the patient understood and agreed. Adrian was scheduled for the repair of his umbilical hernia with mesh on Friday, April 8, 2022 under LOCAL with IV SEDATION at [facility] with presurgical testing preceding this date. He was encouraged to avoid heavy lifting and strenuous activity in the interim, of course. We also discussed the importance of calorie restriction and healthy eating with regard to weight loss, hernia recurrence and his overall health.\par \par He will hold his Xarelto 2 days prior to surgery but may remain on his baby aspirin throughout the perioperative period.

## 2022-03-08 NOTE — PHYSICAL EXAM
[Normal Breath Sounds] : Normal breath sounds [No Rash or Lesion] : No rash or lesion [Alert] : alert [Calm] : calm [JVD] : no jugular venous distention  [de-identified] : overweight [de-identified] : normal [de-identified] : protuberant abdomen, moderate diastasis recti\par  [de-identified] : umbilical hernia

## 2022-03-08 NOTE — CONSULT LETTER
[Dear  ___] : Dear  [unfilled], [Courtesy Letter:] : I had the pleasure of seeing your patient, [unfilled], in my office today. [Please see my note below.] : Please see my note below. [Consult Closing:] : Thank you very much for allowing me to participate in the care of this patient.  If you have any questions, please do not hesitate to contact me. [DrMichoacano  ___] : Dr. KDID [DrMichoacano ___] : Dr. KIDD [FreeTextEntry3] : Respectfully,\par \par Jake Harden M.D., FACS\par

## 2022-03-21 ENCOUNTER — RESULT REVIEW (OUTPATIENT)
Age: 67
End: 2022-03-21

## 2022-03-21 ENCOUNTER — OUTPATIENT (OUTPATIENT)
Dept: OUTPATIENT SERVICES | Facility: HOSPITAL | Age: 67
LOS: 1 days | Discharge: HOME | End: 2022-03-21
Payer: COMMERCIAL

## 2022-03-21 VITALS
RESPIRATION RATE: 18 BRPM | SYSTOLIC BLOOD PRESSURE: 150 MMHG | TEMPERATURE: 97 F | HEART RATE: 78 BPM | OXYGEN SATURATION: 100 % | WEIGHT: 251.11 LBS | DIASTOLIC BLOOD PRESSURE: 80 MMHG | HEIGHT: 69 IN

## 2022-03-21 DIAGNOSIS — K42.9 UMBILICAL HERNIA WITHOUT OBSTRUCTION OR GANGRENE: ICD-10-CM

## 2022-03-21 DIAGNOSIS — Z98.890 OTHER SPECIFIED POSTPROCEDURAL STATES: Chronic | ICD-10-CM

## 2022-03-21 DIAGNOSIS — Z01.818 ENCOUNTER FOR OTHER PREPROCEDURAL EXAMINATION: ICD-10-CM

## 2022-03-21 LAB
ALBUMIN SERPL ELPH-MCNC: 4.5 G/DL — SIGNIFICANT CHANGE UP (ref 3.5–5.2)
ALP SERPL-CCNC: 41 U/L — SIGNIFICANT CHANGE UP (ref 30–115)
ALT FLD-CCNC: 28 U/L — SIGNIFICANT CHANGE UP (ref 0–41)
ANION GAP SERPL CALC-SCNC: 13 MMOL/L — SIGNIFICANT CHANGE UP (ref 7–14)
APPEARANCE UR: CLEAR — SIGNIFICANT CHANGE UP
APTT BLD: 45.8 SEC — HIGH (ref 27–39.2)
AST SERPL-CCNC: 24 U/L — SIGNIFICANT CHANGE UP (ref 0–41)
BACTERIA # UR AUTO: NEGATIVE — SIGNIFICANT CHANGE UP
BASOPHILS # BLD AUTO: 0.05 K/UL — SIGNIFICANT CHANGE UP (ref 0–0.2)
BASOPHILS NFR BLD AUTO: 1 % — SIGNIFICANT CHANGE UP (ref 0–1)
BILIRUB SERPL-MCNC: 0.6 MG/DL — SIGNIFICANT CHANGE UP (ref 0.2–1.2)
BILIRUB UR-MCNC: NEGATIVE — SIGNIFICANT CHANGE UP
BUN SERPL-MCNC: 18 MG/DL — SIGNIFICANT CHANGE UP (ref 10–20)
CALCIUM SERPL-MCNC: 9.5 MG/DL — SIGNIFICANT CHANGE UP (ref 8.5–10.1)
CHLORIDE SERPL-SCNC: 101 MMOL/L — SIGNIFICANT CHANGE UP (ref 98–110)
CO2 SERPL-SCNC: 25 MMOL/L — SIGNIFICANT CHANGE UP (ref 17–32)
COLOR SPEC: YELLOW — SIGNIFICANT CHANGE UP
CREAT SERPL-MCNC: 1 MG/DL — SIGNIFICANT CHANGE UP (ref 0.7–1.5)
DIFF PNL FLD: NEGATIVE — SIGNIFICANT CHANGE UP
EGFR: 82 ML/MIN/1.73M2 — SIGNIFICANT CHANGE UP
EOSINOPHIL # BLD AUTO: 0.08 K/UL — SIGNIFICANT CHANGE UP (ref 0–0.7)
EOSINOPHIL NFR BLD AUTO: 1.6 % — SIGNIFICANT CHANGE UP (ref 0–8)
EPI CELLS # UR: 0 /HPF — SIGNIFICANT CHANGE UP (ref 0–5)
GLUCOSE SERPL-MCNC: 102 MG/DL — HIGH (ref 70–99)
GLUCOSE UR QL: NEGATIVE — SIGNIFICANT CHANGE UP
HCT VFR BLD CALC: 39.6 % — LOW (ref 42–52)
HGB BLD-MCNC: 13.6 G/DL — LOW (ref 14–18)
HYALINE CASTS # UR AUTO: 1 /LPF — SIGNIFICANT CHANGE UP (ref 0–7)
IMM GRANULOCYTES NFR BLD AUTO: 0.2 % — SIGNIFICANT CHANGE UP (ref 0.1–0.3)
INR BLD: 1.86 RATIO — HIGH (ref 0.65–1.3)
KETONES UR-MCNC: NEGATIVE — SIGNIFICANT CHANGE UP
LEUKOCYTE ESTERASE UR-ACNC: ABNORMAL
LYMPHOCYTES # BLD AUTO: 1.15 K/UL — LOW (ref 1.2–3.4)
LYMPHOCYTES # BLD AUTO: 23.7 % — SIGNIFICANT CHANGE UP (ref 20.5–51.1)
MCHC RBC-ENTMCNC: 31.7 PG — HIGH (ref 27–31)
MCHC RBC-ENTMCNC: 34.3 G/DL — SIGNIFICANT CHANGE UP (ref 32–37)
MCV RBC AUTO: 92.3 FL — SIGNIFICANT CHANGE UP (ref 80–94)
MONOCYTES # BLD AUTO: 0.56 K/UL — SIGNIFICANT CHANGE UP (ref 0.1–0.6)
MONOCYTES NFR BLD AUTO: 11.5 % — HIGH (ref 1.7–9.3)
NEUTROPHILS # BLD AUTO: 3.01 K/UL — SIGNIFICANT CHANGE UP (ref 1.4–6.5)
NEUTROPHILS NFR BLD AUTO: 62 % — SIGNIFICANT CHANGE UP (ref 42.2–75.2)
NITRITE UR-MCNC: NEGATIVE — SIGNIFICANT CHANGE UP
NRBC # BLD: 0 /100 WBCS — SIGNIFICANT CHANGE UP (ref 0–0)
PH UR: 6 — SIGNIFICANT CHANGE UP (ref 5–8)
PLATELET # BLD AUTO: 164 K/UL — SIGNIFICANT CHANGE UP (ref 130–400)
POTASSIUM SERPL-MCNC: 4.3 MMOL/L — SIGNIFICANT CHANGE UP (ref 3.5–5)
POTASSIUM SERPL-SCNC: 4.3 MMOL/L — SIGNIFICANT CHANGE UP (ref 3.5–5)
PROT SERPL-MCNC: 6.8 G/DL — SIGNIFICANT CHANGE UP (ref 6–8)
PROT UR-MCNC: NEGATIVE — SIGNIFICANT CHANGE UP
PROTHROM AB SERPL-ACNC: 21.3 SEC — HIGH (ref 9.95–12.87)
RBC # BLD: 4.29 M/UL — LOW (ref 4.7–6.1)
RBC # FLD: 13.3 % — SIGNIFICANT CHANGE UP (ref 11.5–14.5)
RBC CASTS # UR COMP ASSIST: 0 /HPF — SIGNIFICANT CHANGE UP (ref 0–4)
SODIUM SERPL-SCNC: 139 MMOL/L — SIGNIFICANT CHANGE UP (ref 135–146)
SP GR SPEC: 1.01 — SIGNIFICANT CHANGE UP (ref 1.01–1.03)
UROBILINOGEN FLD QL: SIGNIFICANT CHANGE UP
WBC # BLD: 4.86 K/UL — SIGNIFICANT CHANGE UP (ref 4.8–10.8)
WBC # FLD AUTO: 4.86 K/UL — SIGNIFICANT CHANGE UP (ref 4.8–10.8)
WBC UR QL: 1 /HPF — SIGNIFICANT CHANGE UP (ref 0–5)

## 2022-03-21 PROCEDURE — 71046 X-RAY EXAM CHEST 2 VIEWS: CPT | Mod: 26

## 2022-03-21 PROCEDURE — 93010 ELECTROCARDIOGRAM REPORT: CPT

## 2022-03-21 RX ORDER — GLYCOPYRROLATE AND FORMOTEROL FUMARATE 9; 4.8 UG/1; UG/1
2 AEROSOL, METERED RESPIRATORY (INHALATION)
Qty: 0 | Refills: 0 | DISCHARGE

## 2022-03-21 NOTE — H&P PST ADULT - NSICDXFAMILYHX_GEN_ALL_CORE_FT
FAMILY HISTORY:  Father  Still living? Unknown  Family history of acute myocardial infarction, Age at diagnosis: Age Unknown    Mother  Still living? Unknown  Family history of acute myocardial infarction, Age at diagnosis: Age Unknown

## 2022-03-21 NOTE — H&P PST ADULT - NSICDXPASTMEDICALHX_GEN_ALL_CORE_FT
PAST MEDICAL HISTORY:  Afib     BPH (benign prostatic hyperplasia)     CAD (coronary artery disease) pci 2016    COPD (chronic obstructive pulmonary disease)     Depression PT DENIES HAVING ANY SUICIDAL IDEATION    GERD without esophagitis     HTN (hypertension)     Myocardial infarction involving other coronary artery Proximal LAD 90%    ETIENNE on CPAP     SOB (shortness of breath) on exertion     Thoracic aortic aneurysm without rupture

## 2022-03-21 NOTE — H&P PST ADULT - HISTORY OF PRESENT ILLNESS
Patient is a 66 year old male presenting to PAST in preparation for  UMBILICAL HERNIA REPAIR WITH MESH on 4/8 under  LSB anesthesia by Dr. Harden  Pt reports he has had umbilical pain for aprox 6 months rates the pain 5/10 He does not reports that anything decreases his pain or aggravates his pain    PATIENT CURRENTLY DENIES CHEST PAIN  SHORTNESS OF BREATH  PALPITATIONS,  DYSURIA, OR UPPER RESPIRATORY INFECTION IN PAST 2 WEEKS  EXERCISE  TOLERANCE  1-2 FLIGHT OF STAIRS  WITHOUT SHORTNESS OF BREATH    Anesthesia Alert  NO--Difficult Airway  NO--History of neck surgery or radiation  NO--Limited ROM of neck  NO--History of Malignant hyperthermia  NO--Personal or family history of Pseudocholinesterase deficiency  NO--Prior Anesthesia Complication  NO--Latex Allergy  yes--Loose teeth ( upper and lower dentures)  NO--History of Rheumatoid Arthritis  yes--ETIENNE ( advised to bring c-pap)  NO-- BLEEDING RISK(  takes xarelto)  NO--Other_____    Patient verbalized understanding of instructions and was given the opportunity to ask questions and have them answered.  As per patient, this is their complete medical and surgical history, including medications both prescribed or over the counter.  Patient denies any signs or symptoms of COVID 19 and denies contact with known positive individuals.  They have an appointment for COVID testing pre-procedure and acknowledge its time and place.  They were instructed to quarantine pre-procedure, practice exposure control measures, continue to self-monitor and report any concerns to their proceduralist.

## 2022-03-21 NOTE — H&P PST ADULT - OCCUPATION
[facility] [employer] [facility]

## 2022-04-05 ENCOUNTER — LABORATORY RESULT (OUTPATIENT)
Age: 67
End: 2022-04-05

## 2022-04-07 NOTE — ASU PATIENT PROFILE, ADULT - FALL HARM RISK - UNIVERSAL INTERVENTIONS
Bed in lowest position, wheels locked, appropriate side rails in place/Call bell, personal items and telephone in reach/Instruct patient to call for assistance before getting out of bed or chair/Non-slip footwear when patient is out of bed/[place] to call system/Physically safe environment - no spills, clutter or unnecessary equipment/Purposeful Proactive Rounding/Room/bathroom lighting operational, light cord in reach

## 2022-04-08 ENCOUNTER — APPOINTMENT (OUTPATIENT)
Dept: SURGERY | Facility: AMBULATORY SURGERY CENTER | Age: 67
End: 2022-04-08
Payer: COMMERCIAL

## 2022-04-08 ENCOUNTER — OUTPATIENT (OUTPATIENT)
Dept: OUTPATIENT SERVICES | Facility: HOSPITAL | Age: 67
LOS: 1 days | Discharge: HOME | End: 2022-04-08

## 2022-04-08 ENCOUNTER — TRANSCRIPTION ENCOUNTER (OUTPATIENT)
Age: 67
End: 2022-04-08

## 2022-04-08 VITALS
SYSTOLIC BLOOD PRESSURE: 107 MMHG | DIASTOLIC BLOOD PRESSURE: 54 MMHG | OXYGEN SATURATION: 95 % | HEART RATE: 60 BPM | RESPIRATION RATE: 20 BRPM

## 2022-04-08 VITALS
RESPIRATION RATE: 18 BRPM | DIASTOLIC BLOOD PRESSURE: 63 MMHG | TEMPERATURE: 98 F | HEART RATE: 62 BPM | SYSTOLIC BLOOD PRESSURE: 132 MMHG | HEIGHT: 69 IN | WEIGHT: 251.11 LBS | OXYGEN SATURATION: 96 %

## 2022-04-08 DIAGNOSIS — Z98.890 OTHER SPECIFIED POSTPROCEDURAL STATES: Chronic | ICD-10-CM

## 2022-04-08 PROCEDURE — 49587: CPT

## 2022-04-08 RX ORDER — OXYCODONE AND ACETAMINOPHEN 5; 325 MG/1; MG/1
1 TABLET ORAL
Qty: 24 | Refills: 0
Start: 2022-04-08 | End: 2022-04-11

## 2022-04-08 RX ORDER — HYDROMORPHONE HYDROCHLORIDE 2 MG/ML
0.5 INJECTION INTRAMUSCULAR; INTRAVENOUS; SUBCUTANEOUS
Refills: 0 | Status: DISCONTINUED | OUTPATIENT
Start: 2022-04-08 | End: 2022-04-08

## 2022-04-08 RX ORDER — SODIUM CHLORIDE 9 MG/ML
1000 INJECTION, SOLUTION INTRAVENOUS
Refills: 0 | Status: DISCONTINUED | OUTPATIENT
Start: 2022-04-08 | End: 2022-04-22

## 2022-04-08 RX ORDER — ACETAMINOPHEN 500 MG
1000 TABLET ORAL ONCE
Refills: 0 | Status: COMPLETED | OUTPATIENT
Start: 2022-04-08 | End: 2022-04-08

## 2022-04-08 RX ORDER — MORPHINE SULFATE 50 MG/1
2 CAPSULE, EXTENDED RELEASE ORAL
Refills: 0 | Status: DISCONTINUED | OUTPATIENT
Start: 2022-04-08 | End: 2022-04-08

## 2022-04-08 RX ORDER — ONDANSETRON 8 MG/1
4 TABLET, FILM COATED ORAL ONCE
Refills: 0 | Status: DISCONTINUED | OUTPATIENT
Start: 2022-04-08 | End: 2022-04-22

## 2022-04-08 RX ORDER — ACETAMINOPHEN 500 MG
650 TABLET ORAL ONCE
Refills: 0 | Status: DISCONTINUED | OUTPATIENT
Start: 2022-04-08 | End: 2022-04-08

## 2022-04-08 RX ADMIN — Medication 1000 MILLIGRAM(S): at 07:57

## 2022-04-08 RX ADMIN — SODIUM CHLORIDE 100 MILLILITER(S): 9 INJECTION, SOLUTION INTRAVENOUS at 10:29

## 2022-04-08 NOTE — ASU DISCHARGE PLAN (ADULT/PEDIATRIC) - CARE PROVIDER_API CALL
Jake Harden)  Surgery  [address]  [place] [address]  Phone: [phone]  Fax: [phone]  Scheduled Appointment: 04/14/2022

## 2022-04-08 NOTE — BRIEF OPERATIVE NOTE - NSICDXBRIEFPOSTOP_GEN_ALL_CORE_FT
POST-OP DIAGNOSIS:  Umbilical hernia 08-Apr-2022 08:43:40  Jake Harden   POST-OP DIAGNOSIS:  Umbilical hernia 08-Apr-2022 08:43:40 Incarcerated Jake Harden

## 2022-04-08 NOTE — BRIEF OPERATIVE NOTE - NSICDXBRIEFPROCEDURE_GEN_ALL_CORE_FT
PROCEDURES:  Umbilical hernia repair with mesh 08-Apr-2022 08:43:43  Jake Harden   PROCEDURES:  Umbilical hernia repair with mesh 08-Apr-2022 08:43:43 Incarcerated Jake Harden

## 2022-04-08 NOTE — ASU DISCHARGE PLAN (ADULT/PEDIATRIC) - NS MD DC FALL RISK RISK
For information on Fall & Injury Prevention, visit: [email or web address] OR  [email or web address] OR  https://www.cdc.gov/steadi/patient.html

## 2022-04-13 DIAGNOSIS — I71.2 THORACIC AORTIC ANEURYSM, WITHOUT RUPTURE: ICD-10-CM

## 2022-04-13 DIAGNOSIS — K21.9 GASTRO-ESOPHAGEAL REFLUX DISEASE WITHOUT ESOPHAGITIS: ICD-10-CM

## 2022-04-13 DIAGNOSIS — I25.10 ATHEROSCLEROTIC HEART DISEASE OF NATIVE CORONARY ARTERY WITHOUT ANGINA PECTORIS: ICD-10-CM

## 2022-04-13 DIAGNOSIS — K42.0 UMBILICAL HERNIA WITH OBSTRUCTION, WITHOUT GANGRENE: ICD-10-CM

## 2022-04-13 DIAGNOSIS — Z86.73 PERSONAL HISTORY OF TRANSIENT ISCHEMIC ATTACK (TIA), AND CEREBRAL INFARCTION WITHOUT RESIDUAL DEFICITS: ICD-10-CM

## 2022-04-13 DIAGNOSIS — I10 ESSENTIAL (PRIMARY) HYPERTENSION: ICD-10-CM

## 2022-04-13 DIAGNOSIS — Z79.01 LONG TERM (CURRENT) USE OF ANTICOAGULANTS: ICD-10-CM

## 2022-04-13 DIAGNOSIS — R06.02 SHORTNESS OF BREATH: ICD-10-CM

## 2022-04-13 DIAGNOSIS — F32.A DEPRESSION, UNSPECIFIED: ICD-10-CM

## 2022-04-13 DIAGNOSIS — N40.0 BENIGN PROSTATIC HYPERPLASIA WITHOUT LOWER URINARY TRACT SYMPTOMS: ICD-10-CM

## 2022-04-13 DIAGNOSIS — Z95.1 PRESENCE OF AORTOCORONARY BYPASS GRAFT: ICD-10-CM

## 2022-04-13 DIAGNOSIS — G47.33 OBSTRUCTIVE SLEEP APNEA (ADULT) (PEDIATRIC): ICD-10-CM

## 2022-04-13 DIAGNOSIS — I48.91 UNSPECIFIED ATRIAL FIBRILLATION: ICD-10-CM

## 2022-04-13 DIAGNOSIS — K42.9 UMBILICAL HERNIA WITHOUT OBSTRUCTION OR GANGRENE: ICD-10-CM

## 2022-04-13 DIAGNOSIS — Z87.891 PERSONAL HISTORY OF NICOTINE DEPENDENCE: ICD-10-CM

## 2022-04-14 ENCOUNTER — APPOINTMENT (OUTPATIENT)
Dept: SURGERY | Facility: CLINIC | Age: 67
End: 2022-04-14
Payer: COMMERCIAL

## 2022-04-14 DIAGNOSIS — K42.9 UMBILICAL HERNIA W/OUT OBSTRUCTION OR GANGRENE: ICD-10-CM

## 2022-04-14 PROCEDURE — 99024 POSTOP FOLLOW-UP VISIT: CPT

## 2022-04-14 NOTE — CONSULT LETTER
[FreeTextEntry1] : Dear Dr. Agustín Ryder, \par \par I had the pleasure of seeing your patient, ADRIAN WHYTE, in my office today. Please see my note below. \par \par Thank you very much for allowing me to participate in the care of this patient. If you have any questions, please do not hesitate to contact me. \par \par \par Respectfully,\par \par Jake Harden M.D., FACS\par  \par \par \par \par cc: Dr. Adrian Ceja \par Dr. Ross Gilbert \par Dr. Davonte Webb

## 2022-04-14 NOTE — ASSESSMENT
[FreeTextEntry1] : Adrian underwent the repair of his incarcerated umbilical hernia with mesh on April 8, 2022 under local with IV sedation without any problems or complications. His wound is clean, dry and intact. There is no evidence of erythema, seroma formation or infection. He is tolerating a diet and having normal bowel movements. He denies any significant postoperative pain or discomfort at this time.\par \par He was counseled and reassured.  Adrian was discharged from the office with no specific followup necessary, but he knows to avoid any heavy lifting or strenuous activity for the next several weeks.  We also discussed the importance of calorie restriction and healthy eating with regard to weight loss, hernia recurrence and his overall health.  He was encouraged to continue to wear his abdominal binder for the better part of the next month.

## 2022-10-25 NOTE — PRE-ANESTHESIA EVALUATION ADULT - NSANTHINDVINFOSD_GEN_ALL_CORE
Called back to Jose D, Pharmacist.    Jose D notes if Dr. Montiel desired a 4 week supply, for them to dispense all at once, it should be written for 4ml (12mL for a 12 week supply). The product comes in 1ml, single use vials.   
Pharmacy is call in rewards to testosterone cypionate (DEPO-TESTOSTERONE) 200 MG/ML injectable solution. Would like clarify medication. Please advise  
patient

## 2022-12-16 PROBLEM — F32.9 MAJOR DEPRESSIVE DISORDER, SINGLE EPISODE, UNSPECIFIED: Chronic | Status: ACTIVE | Noted: 2018-10-16

## 2022-12-31 NOTE — H&P PST ADULT - BP NONINVASIVE MEAN (MM HG)
CONSTITUTIONAL: Well-appearing; well-nourished; in no apparent distress.   EYES: PERRL; EOM intact.   CARDIOVASCULAR: Normal S1, S2; no murmurs, rubs, or gallops.   RESPIRATORY: Normal chest excursion with respiration; breath sounds clear and equal bilaterally; no wheezes, rhonchi, or rales.  GI/: Normal bowel sounds; non-distended; non-tender; no palpable organomegaly.   MS: No calf swelling and tenderness.  SKIN: Normal for age and race; warm; dry; good turgor; no apparent lesions or exudate.   NEURO/PSYCH: A & O x 4; grossly unremarkable. 105

## 2023-01-03 ENCOUNTER — OUTPATIENT (OUTPATIENT)
Dept: OUTPATIENT SERVICES | Facility: HOSPITAL | Age: 68
LOS: 1 days | Discharge: HOME | End: 2023-01-03

## 2023-01-03 DIAGNOSIS — I35.9 NONRHEUMATIC AORTIC VALVE DISORDER, UNSPECIFIED: ICD-10-CM

## 2023-01-03 DIAGNOSIS — Z98.890 OTHER SPECIFIED POSTPROCEDURAL STATES: Chronic | ICD-10-CM

## 2023-01-03 RX ORDER — DRONEDARONE 400 MG/1
1 TABLET, FILM COATED ORAL
Qty: 60 | Refills: 2
Start: 2023-01-03 | End: 2023-04-02

## 2023-01-03 RX ORDER — DRONEDARONE 400 MG/1
1 TABLET, FILM COATED ORAL
Qty: 0 | Refills: 0 | DISCHARGE

## 2023-01-03 NOTE — ASU PATIENT PROFILE, ADULT - FALL HARM RISK - UNIVERSAL INTERVENTIONS
Bed in lowest position, wheels locked, appropriate side rails in place/Call bell, personal items and telephone in reach/Instruct patient to call for assistance before getting out of bed or chair/Non-slip footwear when patient is out of bed/[place] to call system/Physically safe environment - no spills, clutter or unnecessary equipment/Purposeful Proactive Rounding/Room/bathroom lighting operational, light cord in reach

## 2023-01-03 NOTE — CHART NOTE - NSCHARTNOTEFT_GEN_A_CORE
POST OPERATIVE PROCEDURAL DOCUMENTATION  PRE-OP DIAGNOSIS:  Bicuspid aortic valve    POST-OP DIAGNOSIS:  Bicuspid aortic valve  Moderate AI    PROCEDURE: Transesophageal echocardiogram    Primary Physician: Dr. Ryder  Assistant: Dr. Choi     ANESTHESIA TYPE  [  ] General Anesthesia  [ x ] Conscious Sedation  [  ] Local/Regional    CONDITION  [  ] Critical  [  ] Serious  [  ] Fair  [ x ] Good    SPECIMENS REMOVED (IF APPLICABLE): N/A    IMPLANTS (IF APPLICABLE): None    ESTIMATED BLOOD LOSS: None    COMPLICATIONS: None      FINDINGS:    After risks and benefits of procedures were explained, informed consent was obtained and placed in chart. Refer to Anesthesia note for sedation details.  The MIRIAM probe was passed into the esophagus without difficulty.  Transesophageal and transgastric images were obtained.  The MIRIAM probe was removed without difficulty and examined.  There was no evidence for bleeding.  The patient tolerated the procedure well without any immediate MIRIAM-related complications.      Preliminary Findings:  LA: mildly enlarged   GARRET: Left atrial appendage was clear of clot and smoke.  LV: LVEF was estimated at 45-50%  MV: trivial MR  AV: Bicuspid , Mild to moderate AI  RA: Normal   TV: not well visualise    There was mild, non-mobile atheroma seen in the thoracic aorta.         DIAGNOSIS/IMPRESSION:  Bicuspid aortic valve  Moderate AI    PLAN OF CARE:  Discharge Home today   cont with Current home medications   F/u with Cardiology as outpatient

## 2023-01-03 NOTE — H&P CARDIOLOGY - HISTORY OF PRESENT ILLNESS
HPI  66 y/o male with PMHx of HTN, HLD, GERD, CAD NSTEMI s/p PCI, COPD, Afib s/p ablation and Bicuspid aortic valve presents for elective MIRIAM. patient was found to have reduced EF as outpatient and was recommended to do MIRIAM. patient last took his Xarelto last night. patient has no complaints    REVIEW OF SYSTEMS:  CONSTITUTIONAL: No weakness, fevers or chills  EYES/ENT: No visual changes;  No vertigo or throat pain   NECK: No pain or stiffness  RESPIRATORY: No cough, wheezing, hemoptysis; SEE HPI  CARDIOVASCULAR: SEE HPI  GASTROINTESTINAL: No abdominal or epigastric pain. No nausea, vomiting, or hematemesis; No diarrhea or constipation. No melena or hematochezia.  GENITOURINARY: No dysuria, frequency or hematuria  NEUROLOGICAL: No numbness or weakness  SKIN: No itching, rashes      PHYSICAL EXAM:  GENERAL: NAD  HEAD:  Atraumatic, Normocephalic  EYES: conjunctiva and sclera clear  NECK: No JVD  CHEST/LUNG: Clear to auscultation bilaterally; No wheeze  HEART: Regular rate and rhythm; No murmurs  ABDOMEN: Soft, Nontender, Nondistended; Bowel sounds present  EXTREMITIES:  2+ Peripheral Pulses, No clubbing, cyanosis, or edema  NEUROLOGY:  A&Ox3, appropriate  SKIN: No rashes or lesions

## 2023-01-03 NOTE — CHART NOTE - NSCHARTNOTEFT_GEN_A_CORE
PACU ANESTHESIA ADMISSION NOTE      Procedure:   Post op diagnosis:      ____  Intubated  TV:______       Rate: ______      FiO2: ______    _x___  Patent Airway    x____  Full return of protective reflexes    _x___  Full recovery from anesthesia / back to baseline     Vitals:   T:           R:  11                BP:   149/85               Sat: 98                  P: 84      Mental Status:  _x___ Awake   _x____ Alert   _____ Drowsy   _____ Sedated    Nausea/Vomiting:  x____ NO  ______Yes,   See Post - Op Orders          Pain Scale (0-10):  _x____    Treatment: ____ None    ____ See Post - Op/PCA Orders    Post - Operative Fluids:   __x__ Oral   ____ See Post - Op Orders    Plan: Discharge:   x____Home       _____Floor     _____Critical Care    _____  Other:_________________    Comments: tolerated procedure well

## 2023-01-11 NOTE — H&P CARDIOLOGY - HISTORY OF PRESENT ILLNESS
68 y/o male with PMHx of HTN, HLD, GERD, CAD NSTEMI s/p PCI in 2016 to Proximal LAD, COPD, Afib s/p ablation on Xarelto(last dose taken      ) was found to have reduced EF outpatient- EF 45-50% and had MIRIAM 1/3/23 that showed Bicuspid aortic valve moderate AI. Patient presents today to the cardiology department for LHC.   Sx history: hernia repair  Family History: Mother/Father: MI        Vital Signs Last 24 Hrs  T(C): --  T(F): --  HR: --  BP: --  BP(mean): --  RR: --  SpO2: --        Pre cath note:  indication:  [ ] STEMI                [ ] NSTEMI                 [ ] Acute coronary syndrome                   [ ]Unstable Angina   [ ] high risk  [ ] intermediate risk  [ ] low risk                   [ ] Stable Angina     non-invasive testing:                          Date:                     result: [ ] high risk  [ ] intermediate risk  [ ] low risk    Anti- Anginal medications:                    [ ] not used                       [ ] used                   [ ] not used but strong indication not to use    Ejection Fraction                   [ ] <29            [ ] 30-39%   [x ] 40-49%     [ ]>50%    CHF                   [ ] active (within last 14 days on meds   [ ] Chronic (on meds but no exacerbation)    COPD                   [ ] mild (on chronic bronchodilators)  [ ] moderate (on chronic steroid therapy)      [ ] severe (indication for home O2 or PACO2 >50)    Other risk factors:                     [x ] Previous MI                     [ ] CVA/ stroke                    [ ] carotid stent/ CEA                    [ ] PVD/PAD- (arterial aneurysm, non-palpable pulses, tortuous vessel with inability to insert catheter, infra-renal dissection, renal or subclavian artery stenosis)                    [ ] diabetic                    [ ] previous CABG                    [ ] Renal Failure     Bleeding Risk:               RIGHT RADIAL ARTERY EVALUATION:  SUAD TEST: [] Negative          [] Positive  BARBEAU TEST: [] Class A           [] Class B           [] Class C            [] Class D    REVIEW OF SYSTEMS:  CONSTITUTIONAL: No fever, weight loss, or fatigue  CARDIOLOGY: PAtient denies chest pain, shortness of breath or syncopal episodes.   RESPIRATORY: denies shortness of breath, wheezing  NEUROLOGICAL: NO weakness, no focal deficits to report.  ENDOCRINOLOGICAL: no recent change in diabetic medications.   GI: no BRBPR, no N,V,diarrhea.     PHYSICAL EXAM:  · CONSTITUTIONAL:	Well-developed, well nourished    ·RESPIRATORY:   airway patent; breath sounds equal; good air movement; respirations non-labored; clear to auscultation bilaterally; no chest wall tenderness; no intercostal retractions; no rales,rhonchi or wheeze  · CARDIOVASCULAR	regular rate and rhythm  no rub  no murmur  normal PMI  · EXTREMITIES: No cyanosis, clubbing or edema  · VASCULAR: 	Equal and normal pulses (carotid, femoral, dorsalis pedis)  	      EF: 45-50% 1/3/23  EKG:  68 y/o male with PMHx of HTN, HLD, + family h/o CAD (mother/father MI in 60s), GERD, CAD NSTEMI s/p PCI in 2016 to Proximal LAD, COPD, Afib s/p ablation on Xarelto(last dose taken 1/10 ) was found to have reduced EF outpatient- EF 45-50% and had MIRIAM 1/3/23 that showed Bicuspid aortic valve moderate AI. Patient presents today to the cardiology department for LHC.     Pre cath note:  indication:  [ ] STEMI                [ ] NSTEMI                 [ ] Acute coronary syndrome                   [ ]Unstable Angina   [ ] high risk  [ ] intermediate risk  [ ] low risk                   [ ] Stable Angina     non-invasive testing:                          Date:                     result: [ ] high risk  [ ] intermediate risk  [ ] low risk    Anti- Anginal medications:                    [ ] not used                       [x ] used                   [ ] not used but strong indication not to use    Ejection Fraction                   [ ] <29            [ ] 30-39%   [x ] 40-49%     [ ]>50%    CHF                   [ ] active (within last 14 days on meds   [ ] Chronic (on meds but no exacerbation)    COPD                   [ ] mild (on chronic bronchodilators)  [ ] moderate (on chronic steroid therapy)      [ ] severe (indication for home O2 or PACO2 >50)    Other risk factors:                     [x ] Previous MI                     [ ] CVA/ stroke                    [ ] carotid stent/ CEA                    [ ] PVD/PAD- (arterial aneurysm, non-palpable pulses, tortuous vessel with inability to insert catheter, infra-renal dissection, renal or subclavian artery stenosis)                    [ ] diabetic                    [ ] previous CABG                    [ ] Renal Failure     Bleeding Risk:     Eh test: positive    Prehydration: NS 250ml bolus x 1hr prior to cardiac cath

## 2023-01-12 ENCOUNTER — OUTPATIENT (OUTPATIENT)
Dept: OUTPATIENT SERVICES | Facility: HOSPITAL | Age: 68
LOS: 1 days | Discharge: HOME | End: 2023-01-12

## 2023-01-12 DIAGNOSIS — Z98.890 OTHER SPECIFIED POSTPROCEDURAL STATES: Chronic | ICD-10-CM

## 2023-01-12 LAB
ANION GAP SERPL CALC-SCNC: 10 MMOL/L — SIGNIFICANT CHANGE UP (ref 7–14)
BUN SERPL-MCNC: 22 MG/DL — HIGH (ref 10–20)
CALCIUM SERPL-MCNC: 8.8 MG/DL — SIGNIFICANT CHANGE UP (ref 8.4–10.5)
CHLORIDE SERPL-SCNC: 103 MMOL/L — SIGNIFICANT CHANGE UP (ref 98–110)
CO2 SERPL-SCNC: 24 MMOL/L — SIGNIFICANT CHANGE UP (ref 17–32)
CREAT SERPL-MCNC: 1.2 MG/DL — SIGNIFICANT CHANGE UP (ref 0.7–1.5)
EGFR: 66 ML/MIN/1.73M2 — SIGNIFICANT CHANGE UP
GLUCOSE SERPL-MCNC: 115 MG/DL — HIGH (ref 70–99)
HCT VFR BLD CALC: 40.2 % — LOW (ref 42–52)
HGB BLD-MCNC: 13.5 G/DL — LOW (ref 14–18)
MCHC RBC-ENTMCNC: 32.1 PG — HIGH (ref 27–31)
MCHC RBC-ENTMCNC: 33.6 G/DL — SIGNIFICANT CHANGE UP (ref 32–37)
MCV RBC AUTO: 95.5 FL — HIGH (ref 80–94)
NRBC # BLD: 0 /100 WBCS — SIGNIFICANT CHANGE UP (ref 0–0)
PLATELET # BLD AUTO: 164 K/UL — SIGNIFICANT CHANGE UP (ref 130–400)
POTASSIUM SERPL-MCNC: 4.6 MMOL/L — SIGNIFICANT CHANGE UP (ref 3.5–5)
POTASSIUM SERPL-SCNC: 4.6 MMOL/L — SIGNIFICANT CHANGE UP (ref 3.5–5)
RBC # BLD: 4.21 M/UL — LOW (ref 4.7–6.1)
RBC # FLD: 14.3 % — SIGNIFICANT CHANGE UP (ref 11.5–14.5)
SODIUM SERPL-SCNC: 137 MMOL/L — SIGNIFICANT CHANGE UP (ref 135–146)
WBC # BLD: 7.35 K/UL — SIGNIFICANT CHANGE UP (ref 4.8–10.8)
WBC # FLD AUTO: 7.35 K/UL — SIGNIFICANT CHANGE UP (ref 4.8–10.8)

## 2023-01-12 RX ORDER — FLUTICASONE FUROATE, UMECLIDINIUM BROMIDE AND VILANTEROL TRIFENATATE 200; 62.5; 25 UG/1; UG/1; UG/1
1 POWDER RESPIRATORY (INHALATION)
Qty: 0 | Refills: 0 | DISCHARGE

## 2023-01-12 RX ORDER — TAMSULOSIN HYDROCHLORIDE 0.4 MG/1
1 CAPSULE ORAL
Qty: 0 | Refills: 0 | DISCHARGE

## 2023-01-12 RX ORDER — ROSUVASTATIN CALCIUM 5 MG/1
1 TABLET ORAL
Qty: 0 | Refills: 0 | DISCHARGE

## 2023-01-12 RX ORDER — ASPIRIN/CALCIUM CARB/MAGNESIUM 324 MG
1 TABLET ORAL
Qty: 0 | Refills: 0 | DISCHARGE

## 2023-01-12 RX ORDER — DULOXETINE HYDROCHLORIDE 30 MG/1
1 CAPSULE, DELAYED RELEASE ORAL
Qty: 0 | Refills: 0 | DISCHARGE

## 2023-01-12 RX ORDER — ALLOPURINOL 300 MG
100 TABLET ORAL
Qty: 0 | Refills: 0 | DISCHARGE

## 2023-01-12 RX ORDER — ALBUTEROL 90 UG/1
3 AEROSOL, METERED ORAL
Qty: 0 | Refills: 0 | DISCHARGE

## 2023-01-12 RX ORDER — IRBESARTAN 75 MG/1
1 TABLET ORAL
Qty: 0 | Refills: 0 | DISCHARGE

## 2023-01-12 RX ORDER — AMLODIPINE BESYLATE 2.5 MG/1
1 TABLET ORAL
Qty: 0 | Refills: 0 | DISCHARGE

## 2023-01-12 RX ORDER — RIVAROXABAN 15 MG-20MG
1 KIT ORAL
Qty: 0 | Refills: 0 | DISCHARGE

## 2023-01-12 NOTE — CHART NOTE - NSCHARTNOTEFT_GEN_A_CORE
PRE-OP DIAGNOSIS:    New Cardiomyopathy/drop in EF 45%, bicuspid aortic valve    PROCEDURE:   [x] Coronary Angiogram   [x] LHC   [] RHC   [x] Intervention (see below)        PHYSICIAN: Dr. Ryder   CARDIOLOGY FELLOW: Dr. Hazel      PROCEDURE DESCRIPTION:     Consent:  [x] Patient   [] Family Member   []  Used     Anesthesia:   [] General   [x] Sedation   [x] Local     Access & Closure:   [x] 6Fr right Radial Artery --> D-stat  [] 6Fr right Femoral Artery   [] 6Fr right Femoral Vein   [] 6Fr right Brachial Vein       IV Contrast:     70   mL        Intervention: iFR to prox RCA stenosis --> iFR 0.95 (non-hemodynamically significant)    Implants: none     FINDINGS:     Coronary Dominance: Right    LM: Minor disease  LAD: Mild disease, patent stent in proximal segment  Diag: Mild disease  LCx: Mild disease  OM: Mild disease  RCA: 50% stenosis in proximal segment (iFR 0.95, not hemodynamically significant), mild diffuse disease  rPDA: Mild disease    LVEDP:  25 mmHg    ESTIMATED BLOOD LOSS: < 10 mL      CONDITION:   [x] Good   [] Fair   [] Critical     SPECIMEN REMOVED: N/A     POST-OP DIAGNOSIS:    [] Normal Coronary Angiogram   [] Minor luminal irregularities  [x] Mild Non-obstructive Coronary Artery Disease (< 50% stenosis)   [] Significant -Vessel Coronary Artery Disease     PLAN OF CARE:   [x] D/C Home Today   [] Return to In-patient bed   [] Admit for observation   [] Return for Staged Procedure   [] CT Surgery Consult   [x] Medications: Continue same home medications, resume Xarelto starting tomorrow evening   [x] IV Fluids: 75cc/hr for 6 hours NS

## 2023-01-12 NOTE — CHART NOTE - NSCHARTNOTEFT_GEN_A_CORE
Interventional Cardiology PA Radial band Removal Note:    s/p Heparin 10,000			    Pt without complaints.  VSS.    Right Radial access site D-Stat Hemoband in place, __no___ hematoma, _no__bleed  Radial pulse: 2+    Hemostasis achieved with manual release of hemoband.    _No___  Vasovagal reaction.    Meds given: none    Right Radial access site  __no___ hematoma, ___no___ bleed  Radial pulse:    A/P:  s/p Dx Coronary Angiogram/FFR  -	continue to monitor  -	-OOB as tolerated  -	Post Procedure Instructions given  -                   Call [phone] if any access site issues. Interventional Cardiology PA Radial band Removal Note:    s/p Heparin 10,000	 units		    Pt without complaints.  VSS.    Right Radial access site D-Stat Hemoband in place, __no___ hematoma, _no__bleed  Radial pulse: 2+    Hemostasis achieved with manual release of hemoband.    _No___  Vasovagal reaction.    Meds given: none    Right Radial access site  __no___ hematoma, ___no___ bleed  Radial pulse:    A/P:  s/p Dx Coronary Angiogram/FFR  -	continue to monitor  -	-OOB as tolerated  -	Post Procedure Instructions given  -                   Call [phone] if any access site issues.

## 2023-01-24 DIAGNOSIS — Z95.5 PRESENCE OF CORONARY ANGIOPLASTY IMPLANT AND GRAFT: ICD-10-CM

## 2023-01-24 DIAGNOSIS — I10 ESSENTIAL (PRIMARY) HYPERTENSION: ICD-10-CM

## 2023-01-24 DIAGNOSIS — Z79.82 LONG TERM (CURRENT) USE OF ASPIRIN: ICD-10-CM

## 2023-01-24 DIAGNOSIS — F32.9 MAJOR DEPRESSIVE DISORDER, SINGLE EPISODE, UNSPECIFIED: ICD-10-CM

## 2023-01-24 DIAGNOSIS — I25.2 OLD MYOCARDIAL INFARCTION: ICD-10-CM

## 2023-01-24 DIAGNOSIS — Z79.01 LONG TERM (CURRENT) USE OF ANTICOAGULANTS: ICD-10-CM

## 2023-01-24 DIAGNOSIS — E78.5 HYPERLIPIDEMIA, UNSPECIFIED: ICD-10-CM

## 2023-01-24 DIAGNOSIS — K21.9 GASTRO-ESOPHAGEAL REFLUX DISEASE WITHOUT ESOPHAGITIS: ICD-10-CM

## 2023-01-24 DIAGNOSIS — Z82.49 FAMILY HISTORY OF ISCHEMIC HEART DISEASE AND OTHER DISEASES OF THE CIRCULATORY SYSTEM: ICD-10-CM

## 2023-01-24 DIAGNOSIS — I25.10 ATHEROSCLEROTIC HEART DISEASE OF NATIVE CORONARY ARTERY WITHOUT ANGINA PECTORIS: ICD-10-CM

## 2023-01-24 DIAGNOSIS — N40.0 BENIGN PROSTATIC HYPERPLASIA WITHOUT LOWER URINARY TRACT SYMPTOMS: ICD-10-CM

## 2023-01-24 DIAGNOSIS — J44.9 CHRONIC OBSTRUCTIVE PULMONARY DISEASE, UNSPECIFIED: ICD-10-CM

## 2023-01-24 DIAGNOSIS — G47.33 OBSTRUCTIVE SLEEP APNEA (ADULT) (PEDIATRIC): ICD-10-CM

## 2023-01-24 DIAGNOSIS — I48.91 UNSPECIFIED ATRIAL FIBRILLATION: ICD-10-CM

## 2023-01-24 DIAGNOSIS — Z87.891 PERSONAL HISTORY OF NICOTINE DEPENDENCE: ICD-10-CM

## 2023-05-11 ENCOUNTER — OUTPATIENT (OUTPATIENT)
Dept: OUTPATIENT SERVICES | Facility: HOSPITAL | Age: 68
LOS: 1 days | End: 2023-05-11
Payer: COMMERCIAL

## 2023-05-11 DIAGNOSIS — Z00.8 ENCOUNTER FOR OTHER GENERAL EXAMINATION: ICD-10-CM

## 2023-05-11 DIAGNOSIS — R06.2 WHEEZING: ICD-10-CM

## 2023-05-11 DIAGNOSIS — Z98.890 OTHER SPECIFIED POSTPROCEDURAL STATES: Chronic | ICD-10-CM

## 2023-05-11 PROCEDURE — 71250 CT THORAX DX C-: CPT | Mod: 26

## 2023-05-11 PROCEDURE — 71250 CT THORAX DX C-: CPT

## 2023-05-12 DIAGNOSIS — R06.2 WHEEZING: ICD-10-CM

## 2024-03-12 ENCOUNTER — APPOINTMENT (OUTPATIENT)
Dept: SURGERY | Facility: CLINIC | Age: 69
End: 2024-03-12
Payer: COMMERCIAL

## 2024-03-12 VITALS — BODY MASS INDEX: 36.29 KG/M2 | HEIGHT: 69 IN | WEIGHT: 245 LBS

## 2024-03-12 DIAGNOSIS — M62.08 SEPARATION OF MUSCLE (NONTRAUMATIC), OTHER SITE: ICD-10-CM

## 2024-03-12 DIAGNOSIS — E66.09 OTHER OBESITY DUE TO EXCESS CALORIES: ICD-10-CM

## 2024-03-12 DIAGNOSIS — M79.89 OTHER SPECIFIED SOFT TISSUE DISORDERS: ICD-10-CM

## 2024-03-12 PROCEDURE — 99214 OFFICE O/P EST MOD 30 MIN: CPT

## 2024-03-12 NOTE — ASSESSMENT
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  Physical examination demonstrates a small subcentimeter firm nodule in the right lower quadrant subcutaneous tissues which is likely consistent with a benign soft tissue mass and is of no significant clinical concern at this time.  There is no surrounding hernia.  There is no evidence of recurrent ventral or umbilical hernia.  Despite a moderate amount of weight loss, he still has a moderate rectus diastasis.  His current BMI is 36.  Adrian was counseled and reassured.  No surgical intervention is warranted.  He will continue to monitor the soft tissue nodule and if he notices any dramatic changes, I recommend surgical excision.  He may return to me in the future if he develops any hernia related issues, of course.  We did discuss the importance of calorie restriction and healthy eating along with aerobic exercise with regard to weight loss, hernia recurrence, new hernia development, his rectus diastasis, and his overall health.  A total of 30 minutes was spent on this encounter.

## 2024-03-12 NOTE — CONSULT LETTER
[Dear  ___] : Dear  [unfilled], [Courtesy Letter:] : I had the pleasure of seeing your patient, [unfilled], in my office today. [Please see my note below.] : Please see my note below. [Consult Closing:] : Thank you very much for allowing me to participate in the care of this patient.  If you have any questions, please do not hesitate to contact me. [FreeTextEntry3] : Respectfully,  Jake Harden M.D., FACS [Dr[facility]ano  ___] : Dr. KIDD

## 2024-03-12 NOTE — PHYSICAL EXAM
[Normal Breath Sounds] : Normal breath sounds [No Rash or Lesion] : No rash or lesion [Alert] : alert [Calm] : calm [JVD] : no jugular venous distention  [de-identified] : overweight [de-identified] : normal [de-identified] : protuberant abdomen, moderate diastasis recti\par   [de-identified] : No hernia recurrence, no new hernia [de-identified] : Small soft tissue nodule right lower quadrant

## 2024-12-17 ENCOUNTER — OUTPATIENT (OUTPATIENT)
Dept: OUTPATIENT SERVICES | Facility: HOSPITAL | Age: 69
LOS: 1 days | End: 2024-12-17
Payer: COMMERCIAL

## 2024-12-17 ENCOUNTER — OUTPATIENT (OUTPATIENT)
Dept: OUTPATIENT SERVICES | Facility: HOSPITAL | Age: 69
LOS: 1 days | Discharge: HOME | End: 2024-12-17
Payer: COMMERCIAL

## 2024-12-17 VITALS — WEIGHT: 211.64 LBS

## 2024-12-17 DIAGNOSIS — Z98.890 OTHER SPECIFIED POSTPROCEDURAL STATES: Chronic | ICD-10-CM

## 2024-12-17 DIAGNOSIS — I25.10 ATHEROSCLEROTIC HEART DISEASE OF NATIVE CORONARY ARTERY WITHOUT ANGINA PECTORIS: ICD-10-CM

## 2024-12-17 PROCEDURE — 93320 DOPPLER ECHO COMPLETE: CPT

## 2024-12-17 PROCEDURE — 93325 DOPPLER ECHO COLOR FLOW MAPG: CPT

## 2024-12-17 PROCEDURE — 93312 ECHO TRANSESOPHAGEAL: CPT

## 2024-12-17 NOTE — CHART NOTE - NSCHARTNOTEFT_GEN_A_CORE
Procedure: Transesophageal echocardiogram  Indication: Bicuspid AV  Pre-procedure Assessment:  Chart reviewed.  I agree with the assessment and plan and noted any changes below.  12-17-24 @ 12:06

## 2024-12-17 NOTE — H&P CARDIOLOGY - HISTORY OF PRESENT ILLNESS
68 y/o M with PMh of CAD PCI, Afib, and bicuspid AV with dilated AA 4.5cmpresenting for MIRIAM for evaluation of Aortic valvulopathy and for Ascending aortic aneurysm.  No absolute contraindication to proceeding with MIRIAM.

## 2024-12-17 NOTE — CHART NOTE - NSCHARTNOTEFT_GEN_A_CORE
POST OPERATIVE PROCEDURAL DOCUMENTATION  PRE-OP DIAGNOSIS: Bicuspid AV. Mild AI. Ascending aortic aneurysm    POST-OP DIAGNOSIS: Bicuspid AV (NCC+RCC). Mild-Moderate AI. Aortic root aneurysm (~5.0cm). Mildly reduced EF (~45%).    PROCEDURE: Transesophageal echocardiogram    Primary Physician: Dr. Ryder  Fellow: Dr. Leonard    ANESTHESIA TYPE  [  ] General Anesthesia  [ x ] Conscious Sedation  [  ] Local/Regional    CONDITION  [  ] Critical  [  ] Serious  [  ] Fair  [ x ] Good    SPECIMENS REMOVED (IF APPLICABLE): N/A    IMPLANTS (IF APPLICABLE): None    ESTIMATED BLOOD LOSS: None    COMPLICATIONS: None      FINDINGS:    After risks and benefits of procedures were explained, informed consent was obtained and placed in chart. Refer to Anesthesia note for sedation details.  The MIRIAM probe was passed into the esophagus without difficulty.  Transesophageal and transgastric images were obtained.  The MIRIAM probe was removed without difficulty and examined.  There was no evidence for bleeding.  The patient tolerated the procedure well without any immediate MIRIAM-related complications.      Preliminary Findings:  LA: normal   GARRET: Left atrial appendage was clear of clot and smoke.  LV: LVEF was estimated at 45%  MV: Mild MR, no evidence of MS.   AV: Bicuspid AV (NCC+ RCC). Mild-Moderate AI (P1/3V358pa), no evidence of AS.   RA: normal  TV: mild TR.   PV: trace PI.   IAS: no PFO. No R-> L shunt.   There was mild, non-mobile atheroma seen in the thoracic aorta.       DIAGNOSIS/IMPRESSION:  Bicuspid AV (NCC+RCC). Mild-Moderate AI.   Aortic root aneurysm (~5.0cm).   Mildly reduced EF (~45%).    PLAN OF CARE:  Discharge home  Outpt follow up with Dr. Ryder  Outpt follow up with [facility]

## 2024-12-17 NOTE — CHART NOTE - NSCHARTNOTEFT_GEN_A_CORE
PACU ANESTHESIA ADMISSION NOTE      Procedure:   Post op diagnosis:      ____  Intubated  TV:______       Rate: ______      FiO2: ______    __x__  Patent Airway    __x__  Full return of protective reflexes    __x__  Full recovery from anesthesia / back to baseline status    Vitals:  T(C): --  HR: --  BP: --  RR: --  SpO2: --    Mental Status:  __x__ Awake   ___x__ Alert   _____ Drowsy   _____ Sedated    Nausea/Vomiting:  __x__ NO  ______Yes,   See Post - Op Orders          Pain Scale (0-10):  _____    Treatment: ____ None    __x__ See Post - Op/PCA Orders    Post - Operative Fluids:   ____ Oral   __x__ See Post - Op Orders    Plan: Discharge:   ____Home       _____Floor     _____Critical Care    _____  Other:_________________    Comments: Patient had smooth intraoperative event, no anesthesia complication.  PACU Vital signs: HR:     64       BP:   140     /      82   RR:     16        O2 Sat: 98       %     Temp 37C

## 2025-01-08 DIAGNOSIS — I25.10 ATHEROSCLEROTIC HEART DISEASE OF NATIVE CORONARY ARTERY WITHOUT ANGINA PECTORIS: ICD-10-CM

## 2025-01-09 DIAGNOSIS — I25.10 ATHEROSCLEROTIC HEART DISEASE OF NATIVE CORONARY ARTERY WITHOUT ANGINA PECTORIS: ICD-10-CM

## 2025-04-15 ENCOUNTER — NON-APPOINTMENT (OUTPATIENT)
Age: 70
End: 2025-04-15

## 2025-04-23 ENCOUNTER — APPOINTMENT (OUTPATIENT)
Dept: SURGERY | Facility: CLINIC | Age: 70
End: 2025-04-23

## 2025-05-27 ENCOUNTER — APPOINTMENT (OUTPATIENT)
Dept: SURGERY | Facility: CLINIC | Age: 70
End: 2025-05-27

## 2025-07-22 ENCOUNTER — APPOINTMENT (OUTPATIENT)
Dept: SURGERY | Facility: CLINIC | Age: 70
End: 2025-07-22

## 2025-08-20 ENCOUNTER — INPATIENT (INPATIENT)
Facility: HOSPITAL | Age: 70
LOS: 0 days | Discharge: HOME | DRG: 310 | End: 2025-08-21
Attending: STUDENT IN AN ORGANIZED HEALTH CARE EDUCATION/TRAINING PROGRAM | Admitting: INTERNAL MEDICINE
Payer: COMMERCIAL

## 2025-08-20 VITALS — WEIGHT: 211.64 LBS

## 2025-08-20 DIAGNOSIS — I48.19 OTHER PERSISTENT ATRIAL FIBRILLATION: ICD-10-CM

## 2025-08-20 DIAGNOSIS — Z98.890 OTHER SPECIFIED POSTPROCEDURAL STATES: Chronic | ICD-10-CM

## 2025-08-20 LAB
ANION GAP SERPL CALC-SCNC: 10 MMOL/L — SIGNIFICANT CHANGE UP (ref 7–14)
APTT BLD: 42.1 SEC — HIGH (ref 27–39.2)
BUN SERPL-MCNC: 27 MG/DL — HIGH (ref 10–20)
CALCIUM SERPL-MCNC: 9.3 MG/DL — SIGNIFICANT CHANGE UP (ref 8.4–10.5)
CHLORIDE SERPL-SCNC: 104 MMOL/L — SIGNIFICANT CHANGE UP (ref 98–110)
CO2 SERPL-SCNC: 26 MMOL/L — SIGNIFICANT CHANGE UP (ref 17–32)
CREAT SERPL-MCNC: 1.2 MG/DL — SIGNIFICANT CHANGE UP (ref 0.7–1.5)
EGFR: 65 ML/MIN/1.73M2 — SIGNIFICANT CHANGE UP
EGFR: 65 ML/MIN/1.73M2 — SIGNIFICANT CHANGE UP
GLUCOSE SERPL-MCNC: 106 MG/DL — HIGH (ref 70–99)
HCT VFR BLD CALC: 40.8 % — LOW (ref 42–52)
HGB BLD-MCNC: 13.2 G/DL — LOW (ref 14–18)
INR BLD: 2.06 RATIO — HIGH (ref 0.65–1.3)
MCHC RBC-ENTMCNC: 30.5 PG — SIGNIFICANT CHANGE UP (ref 27–31)
MCHC RBC-ENTMCNC: 32.4 G/DL — SIGNIFICANT CHANGE UP (ref 32–37)
MCV RBC AUTO: 94.2 FL — HIGH (ref 80–94)
NRBC BLD AUTO-RTO: 0 /100 WBCS — SIGNIFICANT CHANGE UP (ref 0–0)
PLATELET # BLD AUTO: 182 K/UL — SIGNIFICANT CHANGE UP (ref 130–400)
PMV BLD: 10 FL — SIGNIFICANT CHANGE UP (ref 7.4–10.4)
POTASSIUM SERPL-MCNC: 4.9 MMOL/L — SIGNIFICANT CHANGE UP (ref 3.5–5)
POTASSIUM SERPL-SCNC: 4.9 MMOL/L — SIGNIFICANT CHANGE UP (ref 3.5–5)
PROTHROM AB SERPL-ACNC: 24.7 SEC — HIGH (ref 9.95–12.87)
RBC # BLD: 4.33 M/UL — LOW (ref 4.7–6.1)
RBC # FLD: 14.4 % — SIGNIFICANT CHANGE UP (ref 11.5–14.5)
SODIUM SERPL-SCNC: 140 MMOL/L — SIGNIFICANT CHANGE UP (ref 135–146)
WBC # BLD: 4.87 K/UL — SIGNIFICANT CHANGE UP (ref 4.8–10.8)
WBC # FLD AUTO: 4.87 K/UL — SIGNIFICANT CHANGE UP (ref 4.8–10.8)

## 2025-08-20 PROCEDURE — 93010 ELECTROCARDIOGRAM REPORT: CPT

## 2025-08-20 PROCEDURE — 93005 ELECTROCARDIOGRAM TRACING: CPT

## 2025-08-20 PROCEDURE — 71045 X-RAY EXAM CHEST 1 VIEW: CPT

## 2025-08-20 PROCEDURE — 94640 AIRWAY INHALATION TREATMENT: CPT

## 2025-08-20 PROCEDURE — 93010 ELECTROCARDIOGRAM REPORT: CPT | Mod: 77

## 2025-08-20 PROCEDURE — 71045 X-RAY EXAM CHEST 1 VIEW: CPT | Mod: 26

## 2025-08-20 PROCEDURE — 99223 1ST HOSP IP/OBS HIGH 75: CPT

## 2025-08-20 RX ORDER — ENOXAPARIN SODIUM 100 MG/ML
100 INJECTION SUBCUTANEOUS EVERY 12 HOURS
Refills: 0 | Status: DISCONTINUED | OUTPATIENT
Start: 2025-08-20 | End: 2025-08-20

## 2025-08-20 RX ORDER — AMIODARONE HYDROCHLORIDE 50 MG/ML
150 INJECTION, SOLUTION INTRAVENOUS ONCE
Refills: 0 | Status: COMPLETED | OUTPATIENT
Start: 2025-08-20 | End: 2025-08-20

## 2025-08-20 RX ORDER — FUROSEMIDE 10 MG/ML
1 INJECTION INTRAMUSCULAR; INTRAVENOUS
Refills: 0 | DISCHARGE

## 2025-08-20 RX ORDER — ACETAMINOPHEN 500 MG/5ML
650 LIQUID (ML) ORAL ONCE
Refills: 0 | Status: DISCONTINUED | OUTPATIENT
Start: 2025-08-20 | End: 2025-08-21

## 2025-08-20 RX ORDER — RIVAROXABAN 10 MG/1
20 TABLET, FILM COATED ORAL
Refills: 0 | Status: DISCONTINUED | OUTPATIENT
Start: 2025-08-21 | End: 2025-08-21

## 2025-08-20 RX ORDER — PAROXETINE HYDROCHLORIDE 20 MG/1
20 TABLET, FILM COATED ORAL DAILY
Refills: 0 | Status: DISCONTINUED | OUTPATIENT
Start: 2025-08-20 | End: 2025-08-21

## 2025-08-20 RX ORDER — FUROSEMIDE 10 MG/ML
20 INJECTION INTRAMUSCULAR; INTRAVENOUS DAILY
Refills: 0 | Status: DISCONTINUED | OUTPATIENT
Start: 2025-08-20 | End: 2025-08-21

## 2025-08-20 RX ORDER — AMIODARONE HYDROCHLORIDE 50 MG/ML
0.5 INJECTION, SOLUTION INTRAVENOUS
Qty: 450 | Refills: 0 | Status: DISCONTINUED | OUTPATIENT
Start: 2025-08-20 | End: 2025-08-21

## 2025-08-20 RX ORDER — TIOTROPIUM BROMIDE INHALATION SPRAY 3.12 UG/1
2 SPRAY, METERED RESPIRATORY (INHALATION) DAILY
Refills: 0 | Status: DISCONTINUED | OUTPATIENT
Start: 2025-08-20 | End: 2025-08-21

## 2025-08-20 RX ORDER — PAROXETINE HYDROCHLORIDE 20 MG/1
1 TABLET, FILM COATED ORAL
Refills: 0 | DISCHARGE

## 2025-08-20 RX ORDER — ROSUVASTATIN CALCIUM 20 MG/1
10 TABLET, FILM COATED ORAL AT BEDTIME
Refills: 0 | Status: DISCONTINUED | OUTPATIENT
Start: 2025-08-20 | End: 2025-08-21

## 2025-08-20 RX ORDER — ROSUVASTATIN CALCIUM 20 MG/1
1 TABLET, FILM COATED ORAL
Refills: 0 | DISCHARGE

## 2025-08-20 RX ORDER — AMIODARONE HYDROCHLORIDE 50 MG/ML
1 INJECTION, SOLUTION INTRAVENOUS
Qty: 450 | Refills: 0 | Status: DISCONTINUED | OUTPATIENT
Start: 2025-08-20 | End: 2025-08-21

## 2025-08-20 RX ORDER — LOSARTAN POTASSIUM 100 MG/1
100 TABLET, FILM COATED ORAL DAILY
Refills: 0 | Status: DISCONTINUED | OUTPATIENT
Start: 2025-08-20 | End: 2025-08-20

## 2025-08-20 RX ORDER — TAMSULOSIN HYDROCHLORIDE 0.4 MG/1
0.4 CAPSULE ORAL AT BEDTIME
Refills: 0 | Status: DISCONTINUED | OUTPATIENT
Start: 2025-08-20 | End: 2025-08-21

## 2025-08-20 RX ORDER — ASPIRIN 325 MG
81 TABLET ORAL DAILY
Refills: 0 | Status: DISCONTINUED | OUTPATIENT
Start: 2025-08-20 | End: 2025-08-21

## 2025-08-20 RX ADMIN — AMIODARONE HYDROCHLORIDE 16.7 MG/MIN: 50 INJECTION, SOLUTION INTRAVENOUS at 22:37

## 2025-08-20 RX ADMIN — ROSUVASTATIN CALCIUM 10 MILLIGRAM(S): 20 TABLET, FILM COATED ORAL at 22:58

## 2025-08-20 RX ADMIN — TAMSULOSIN HYDROCHLORIDE 0.4 MILLIGRAM(S): 0.4 CAPSULE ORAL at 22:58

## 2025-08-20 RX ADMIN — AMIODARONE HYDROCHLORIDE 600 MILLIGRAM(S): 50 INJECTION, SOLUTION INTRAVENOUS at 16:28

## 2025-08-20 RX ADMIN — AMIODARONE HYDROCHLORIDE 33.3 MG/MIN: 50 INJECTION, SOLUTION INTRAVENOUS at 16:50

## 2025-08-21 ENCOUNTER — TRANSCRIPTION ENCOUNTER (OUTPATIENT)
Age: 70
End: 2025-08-21

## 2025-08-21 VITALS
SYSTOLIC BLOOD PRESSURE: 113 MMHG | RESPIRATION RATE: 18 BRPM | DIASTOLIC BLOOD PRESSURE: 74 MMHG | HEART RATE: 60 BPM | TEMPERATURE: 98 F

## 2025-08-21 PROCEDURE — 99239 HOSP IP/OBS DSCHRG MGMT >30: CPT

## 2025-08-21 PROCEDURE — 93010 ELECTROCARDIOGRAM REPORT: CPT

## 2025-08-21 PROCEDURE — 71045 X-RAY EXAM CHEST 1 VIEW: CPT | Mod: 26

## 2025-08-21 RX ORDER — AMIODARONE HYDROCHLORIDE 50 MG/ML
200 INJECTION, SOLUTION INTRAVENOUS
Refills: 0 | Status: DISCONTINUED | OUTPATIENT
Start: 2025-08-21 | End: 2025-08-21

## 2025-08-21 RX ORDER — AMIODARONE HYDROCHLORIDE 50 MG/ML
1 INJECTION, SOLUTION INTRAVENOUS
Qty: 60 | Refills: 2
Start: 2025-08-21 | End: 2025-11-18

## 2025-08-21 RX ADMIN — TIOTROPIUM BROMIDE INHALATION SPRAY 2 PUFF(S): 3.12 SPRAY, METERED RESPIRATORY (INHALATION) at 07:48

## 2025-08-21 RX ADMIN — AMIODARONE HYDROCHLORIDE 200 MILLIGRAM(S): 50 INJECTION, SOLUTION INTRAVENOUS at 14:44

## 2025-08-21 RX ADMIN — FUROSEMIDE 20 MILLIGRAM(S): 10 INJECTION INTRAMUSCULAR; INTRAVENOUS at 05:10

## 2025-08-21 RX ADMIN — Medication 1 DOSE(S): at 08:53

## 2025-08-21 RX ADMIN — PAROXETINE HYDROCHLORIDE 20 MILLIGRAM(S): 20 TABLET, FILM COATED ORAL at 11:18

## 2025-08-21 RX ADMIN — Medication 81 MILLIGRAM(S): at 11:18

## 2025-08-21 RX ADMIN — AMIODARONE HYDROCHLORIDE 16.7 MG/MIN: 50 INJECTION, SOLUTION INTRAVENOUS at 04:16

## 2025-08-29 DIAGNOSIS — K21.9 GASTRO-ESOPHAGEAL REFLUX DISEASE WITHOUT ESOPHAGITIS: ICD-10-CM

## 2025-08-29 DIAGNOSIS — I71.20 THORACIC AORTIC ANEURYSM, WITHOUT RUPTURE, UNSPECIFIED: ICD-10-CM

## 2025-08-29 DIAGNOSIS — I25.10 ATHEROSCLEROTIC HEART DISEASE OF NATIVE CORONARY ARTERY WITHOUT ANGINA PECTORIS: ICD-10-CM

## 2025-08-29 DIAGNOSIS — I10 ESSENTIAL (PRIMARY) HYPERTENSION: ICD-10-CM

## 2025-08-29 DIAGNOSIS — N40.0 BENIGN PROSTATIC HYPERPLASIA WITHOUT LOWER URINARY TRACT SYMPTOMS: ICD-10-CM

## 2025-08-29 DIAGNOSIS — Z87.891 PERSONAL HISTORY OF NICOTINE DEPENDENCE: ICD-10-CM

## 2025-08-29 DIAGNOSIS — I25.2 OLD MYOCARDIAL INFARCTION: ICD-10-CM

## 2025-08-29 DIAGNOSIS — Z79.51 LONG TERM (CURRENT) USE OF INHALED STEROIDS: ICD-10-CM

## 2025-08-29 DIAGNOSIS — F32.A DEPRESSION, UNSPECIFIED: ICD-10-CM

## 2025-08-29 DIAGNOSIS — Q23.81 BICUSPID AORTIC VALVE: ICD-10-CM

## 2025-08-29 DIAGNOSIS — E78.5 HYPERLIPIDEMIA, UNSPECIFIED: ICD-10-CM

## 2025-08-29 DIAGNOSIS — Z79.82 LONG TERM (CURRENT) USE OF ASPIRIN: ICD-10-CM

## 2025-08-29 DIAGNOSIS — G47.33 OBSTRUCTIVE SLEEP APNEA (ADULT) (PEDIATRIC): ICD-10-CM

## 2025-08-29 DIAGNOSIS — I48.0 PAROXYSMAL ATRIAL FIBRILLATION: ICD-10-CM

## 2025-08-29 DIAGNOSIS — Z79.01 LONG TERM (CURRENT) USE OF ANTICOAGULANTS: ICD-10-CM

## 2025-08-29 DIAGNOSIS — J44.9 CHRONIC OBSTRUCTIVE PULMONARY DISEASE, UNSPECIFIED: ICD-10-CM

## 2025-08-29 DIAGNOSIS — Z99.89 DEPENDENCE ON OTHER ENABLING MACHINES AND DEVICES: ICD-10-CM
